# Patient Record
Sex: FEMALE | Race: WHITE | NOT HISPANIC OR LATINO | Employment: FULL TIME | URBAN - NONMETROPOLITAN AREA
[De-identification: names, ages, dates, MRNs, and addresses within clinical notes are randomized per-mention and may not be internally consistent; named-entity substitution may affect disease eponyms.]

---

## 2017-01-27 ENCOUNTER — OFFICE VISIT (OUTPATIENT)
Dept: URGENT CARE | Facility: PHYSICIAN GROUP | Age: 47
End: 2017-01-27
Payer: COMMERCIAL

## 2017-01-27 VITALS
TEMPERATURE: 98.8 F | OXYGEN SATURATION: 98 % | WEIGHT: 178 LBS | HEART RATE: 82 BPM | DIASTOLIC BLOOD PRESSURE: 86 MMHG | BODY MASS INDEX: 30.54 KG/M2 | SYSTOLIC BLOOD PRESSURE: 144 MMHG

## 2017-01-27 DIAGNOSIS — M25.531 PAIN IN BOTH WRISTS: ICD-10-CM

## 2017-01-27 DIAGNOSIS — M25.532 PAIN IN BOTH WRISTS: ICD-10-CM

## 2017-01-27 DIAGNOSIS — R20.0 BILATERAL HAND NUMBNESS: ICD-10-CM

## 2017-01-27 PROCEDURE — 99203 OFFICE O/P NEW LOW 30 MIN: CPT | Performed by: PHYSICIAN ASSISTANT

## 2017-01-27 NOTE — MR AVS SNAPSHOT
Davina Jones   2017 11:45 AM   Office Visit   MRN: [id number]    Department:  [facility]   Dept Phone:  [phone]    Description:  Female : 1970   Provider:  Annmarie Hogan PA-C           Reason for Visit     Numbness hands and arms, x1 month      Allergies as of 2017     No Known Allergies      You were diagnosed with     Pain in both wrists   [076022]       Bilateral hand numbness   [[id number]]         Vital Signs     Blood Pressure Pulse Temperature Weight Oxygen Saturation Last Menstrual Period    144/86 mmHg 82 37.1 °C (98.8 °F) 80.74 kg (178 lb) 98% 2011    Smoking Status                   Current Some Day Smoker           Basic Information     Date Of Birth Sex Race Ethnicity Preferred Language    1970 Female White Non- English      Problem List              ICD-10-CM Priority Class Noted - Resolved    Incomplete bladder emptying R33.9   2012 - Present    Hyperlipidemia E78.5   2015 - Present    Vitamin D deficiency E55.9   2015 - Present    Obesity (BMI 30-39.9) E66.9   2015 - Present      Health Maintenance        Date Due Completion Dates    IMM DTaP/Tdap/Td Vaccine (1 - Tdap) 1990 ---    PAP SMEAR 1992 ---    MAMMOGRAM 10/11/2013 10/11/2012, 12/10/2010    IMM INFLUENZA (1) 2016            Current Immunizations     Influenza TIV (IM) 2011  5:11 PM    Pneumococcal polysaccharide vaccine (PPSV-23) 2011  5:11 PM      Below and/or attached are the medications your provider expects you to take. Review all of your home medications and newly ordered medications with your provider and/or pharmacist. Follow medication instructions as directed by your provider and/or pharmacist. Please keep your medication list with you and share with your provider. Update the information when medications are discontinued, doses are changed, or new medications (including over-the-counter products) are added; and  carry medication information at all times in the event of emergency situations     Allergies:  No Known Allergies          Medications  Valid as of: January 27, 2017 - 12:08 PM    Generic Name Brand Name Tablet Size Instructions for use    Cyclobenzaprine HCl (Tab) FLEXERIL 10 MG Take 1 Tab by mouth 3 times a day as needed for Muscle Spasms.        Diclofenac Sodium (Gel) Diclofenac Sodium 1 % Apply 2 g to skin as directed 4 times a day as needed (wrist pain).        Hydrocodone-Acetaminophen (Tab) VICODIN 5-500 MG Take 1 Tab by mouth every 8 hours as needed (moderate pain). Will cause sedation, avoid driving, operating heavy machinery, and drinking alcohol          Hydrocodone-Acetaminophen (Tab) VICODIN 5-500 MG Take 1 Tab by mouth every 8 hours as needed.        Ibuprofen   by Does not apply route.          .                 Medicines prescribed today were sent to:     [facility] [id number] [place] [address], [place] - [address] [facility] [address] [place] [address] & [place]    [address] [address] [place] [address]    Phone: [phone] Fax: [phone]    Open 24 Hours?: No      Medication refill instructions:       If your prescription bottle indicates you have medication refills left, it is not necessary to call your provider’s office. Please contact your pharmacy and they will refill your medication.    If your prescription bottle indicates you do not have any refills left, you may request refills at any time through one of the following ways: The online [employer] system (except Urgent Care), by calling your provider’s office, or by asking your pharmacy to contact your provider’s office with a refill request. Medication refills are processed only during regular business hours and may not be available until the next business day. Your provider may request additional information or to have a follow-up visit with you prior to refilling your medication.   *Please Note: Medication refills are assigned a new Rx number  when refilled electronically. Your pharmacy may indicate that no refills were authorized even though a new prescription for the same medication is available at the pharmacy. Please request the medicine by name with the pharmacy before contacting your provider for a refill.           MyChart Access Code: Activation code not generated  Current [employer] Status: Active

## 2017-01-27 NOTE — PROGRESS NOTES
Chief Complaint   Patient presents with   • Numbness     hands and arms, x1 month       HISTORY OF PRESENT ILLNESS: Patient is a 46 y.o. female who presents today for evaluation of a one-month history of bilateral hand and arm pain. Patient states the pain is in both forearms and extends as far up as the elbows. The pain is worse at night and her wrists and hands. She complains of numbness in her hands and decreased  strength bilaterally. She has been taking 800 mg of ibuprofen 3 times a day for the past week and a half. 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 She has no history of carpal tunnel syndrome. She states the numbness is the worst in digits one through 3 but states that she has it in her entire hand.    Patient Active Problem List    Diagnosis Date Noted   • Hyperlipidemia 11/06/2015   • Vitamin D deficiency 11/06/2015   • Obesity (BMI 30-39.9) 11/06/2015   • Incomplete bladder emptying 11/28/2012       Allergies:Review of patient's allergies indicates no known allergies.    Current Outpatient Prescriptions Ordered in [facility]   Medication Sig Dispense Refill   • Diclofenac Sodium (VOLTAREN) 1 % Gel Apply 2 g to skin as directed 4 times a day as needed (wrist pain). 1 Tube 1   • IBUPROFEN by Does not apply route.       • hydrocodone-acetaminophen (VICODIN) 5-500 MG TABS Take 1 Tab by mouth every 8 hours as needed. 25 Each 0   • cyclobenzaprine (FLEXERIL) 10 MG TABS Take 1 Tab by mouth 3 times a day as needed for Muscle Spasms. 30 Tab 0   • hydrocodone-acetaminophen (VICODIN) 5-500 MG TABS Take 1 Tab by mouth every 8 hours as needed (moderate pain). Will cause sedation, avoid driving, operating heavy machinery, and drinking alcohol   15 Each 0     No current [facility]-ordered facility-administered medications on file.       Past Medical History   Diagnosis Date   • Hepatitis A 1997   • Dental disorder 01-04-11     cavities and broken teeth   • Pain 01-04-11     pelvic area, 5/10        Social History   Substance Use Topics   • Smoking status: Current Some Day Smoker -- 0.50 packs/day for 27 years     Types: Cigarettes     Last Attempt to Quit: 2012   • Smokeless tobacco: Never Used      Comment: 1/2-pack a day/ 27 years   • Alcohol Use: Yes      Comment: occassional       Family Status   Relation Status Death Age   • Mother Alive    • Father Alive    • Sister  49   • Maternal Grandmother     • Maternal Grandfather     • Paternal Grandmother     • Paternal Grandfather       Family History   Problem Relation Age of Onset   • Heart Disease Mother    • Hyperlipidemia Mother    • Hypertension Mother    • Lung Disease Father      copd   • Heart Disease Father    • Cancer Sister      lung, smoker   • Diabetes Maternal Grandmother    • Cancer Paternal Grandfather      leukemia       ROS:   Review of Systems   Constitutional: Negative for fever, chills, weight loss and malaise/fatigue.   HENT: Negative for ear pain, nosebleeds, congestion, sore throat and neck pain.    Eyes: Negative for blurred vision.   Respiratory: Negative for cough, sputum production, shortness of breath and wheezing.    Cardiovascular: Negative for chest pain, palpitations, orthopnea and leg swelling.   Gastrointestinal: Negative for heartburn, nausea, vomiting and abdominal pain.   Genitourinary: Negative for dysuria, urgency and frequency.       Exam:  Blood pressure 144/86, pulse 82, temperature 37.1 °C (98.8 °F), weight 80.74 kg (178 lb), last menstrual period 2011, SpO2 98 %.  General: Normal appearing. No distress.  HEENT:  Head is grossly normal.  Pulmonary:  No respiratory distress noted.  Cardiovascular:  Radial pulses are strong and equal bilaterally. Cap refills less than 2 seconds on both hands.  Neurologic: Grossly nonfocal. Generalized very mild sensory deficit noted on both hands.  Extremities: Full range of motion bilateral hands, wrists, and elbows.   strength is strong and equal bilaterally. No extremity swelling noted. Negative Tinel's.  Skin: No obvious lesions.  Psych: Normal mood. Alert and oriented x3. Judgment and insight is normal.    Assessment/Plan:  Continue anti-inflammatories daily. Discussed appropriate dosing of this medication. Advise purchasing wrist guards to use at night only. Follow-up worsening or persistent symptoms. May need to consider orthopedic referral.  1. Pain in both wrists  Diclofenac Sodium (VOLTAREN) 1 % Gel   2. Bilateral hand numbness

## 2017-09-27 ENCOUNTER — OFFICE VISIT (OUTPATIENT)
Dept: URGENT CARE | Facility: PHYSICIAN GROUP | Age: 47
End: 2017-09-27
Payer: COMMERCIAL

## 2017-09-27 VITALS
BODY MASS INDEX: 29.26 KG/M2 | WEIGHT: 171.4 LBS | RESPIRATION RATE: 12 BRPM | DIASTOLIC BLOOD PRESSURE: 84 MMHG | OXYGEN SATURATION: 98 % | TEMPERATURE: 98.9 F | SYSTOLIC BLOOD PRESSURE: 122 MMHG | HEART RATE: 74 BPM | HEIGHT: 64 IN

## 2017-09-27 DIAGNOSIS — A08.4 VIRAL GASTROENTERITIS: ICD-10-CM

## 2017-09-27 PROCEDURE — 99214 OFFICE O/P EST MOD 30 MIN: CPT | Performed by: PHYSICIAN ASSISTANT

## 2017-09-27 RX ORDER — ONDANSETRON 4 MG/1
TABLET, FILM COATED ORAL
Qty: 20 TAB | Refills: 0 | Status: SHIPPED | OUTPATIENT
Start: 2017-09-27 | End: 2017-10-27

## 2017-09-27 RX ORDER — ONDANSETRON 2 MG/ML
4 INJECTION INTRAMUSCULAR; INTRAVENOUS ONCE
Status: COMPLETED | OUTPATIENT
Start: 2017-09-27 | End: 2017-09-27

## 2017-09-27 RX ADMIN — ONDANSETRON 4 MG: 2 INJECTION INTRAMUSCULAR; INTRAVENOUS at 16:14

## 2017-09-27 ASSESSMENT — ENCOUNTER SYMPTOMS
SORE THROAT: 0
CHILLS: 1
ABDOMINAL PAIN: 0
FEVER: 0
NAUSEA: 1
COUGH: 0
MYALGIAS: 1
VOMITING: 1
HEARTBURN: 0
HEADACHES: 0
CONSTIPATION: 0
DIARRHEA: 1

## 2017-09-27 NOTE — LETTER
September 27, 2017         Patient: Davina Jones   YOB: 1970   Date of Visit: 9/27/2017           To Whom it May Concern:    Davina Jones was seen in my clinic on 9/27/2017. She can return to work on Sunday, October 1st.    If you have any questions or concerns, please don't hesitate to call.        Sincerely,           Magnolia Callejas P.A.-C.  Electronically Signed

## 2017-09-27 NOTE — PROGRESS NOTES
"Subjective:      Davina Jones is a 47 y.o. female who presents with Emesis (dehydration, diarrhea, nausea, dizzy)            Subjective: Patient is a 47-year-old female with prior hysterectomy, who comes in with a one day history of vomiting ×3 and diarrhea approximately 8-10 brown loose bowel movements. She denies any recent travel or recent antibiotic use. This started last night. No one else has similar symptoms. She states that she feels weak because of the episodes of vomiting and diarrhea. She's had some liquids but has not had any solids. She has no appetite. She denies abdominal pain, rash, any respiratory symptoms    Past medical history: Is not pertinent to this examination  Past surgical history: Not pertinent to this examination  Family history: Is not pertinent to this examination  Allergies: No known drug allergies  Social history: Is reviewed in Epic          Review of Systems   Constitutional: Positive for chills. Negative for fever.   HENT: Negative for sore throat.    Respiratory: Negative for cough.    Cardiovascular: Negative for chest pain.   Gastrointestinal: Positive for diarrhea, nausea and vomiting. Negative for abdominal pain, constipation and heartburn.   Genitourinary: Negative for dysuria and urgency.   Musculoskeletal: Positive for myalgias.   Skin: Negative for rash.   Neurological: Negative for headaches.          Objective:     /84   Pulse 74   Temp 37.2 °C (98.9 °F)   Resp 12   Ht 1.626 m (5' 4\")   Wt 77.7 kg (171 lb 6.4 oz)   LMP 01/01/2011   SpO2 98%   BMI 29.42 kg/m²      Physical Exam       Gen.: Patient is A and O ×3, no acute distress, well-nourished well-hydrated  Vitals: Are listed and unremarkable  HEENT: Heads normocephalic, atraumatic, PERRLA, extraocular movements intact, TMs and oropharynx clear  Neck: Soft supple without cervical lymphadenopathy  Cardiovascular: Regular rate and rhythm normal S1 and S2. No murmurs, rubs or gallops  Lungs " are clear to auscultation bilaterally. no wheezes rales or rhonchi  Abdomen is soft, nontender, nondistended with good bowel sounds, no hepatosplenomegaly  Skin: Is well perfused without evidence of rash or lesions  Neurological:  cranial nerves II through XII were assessed and intact.  Musculoskeletal: Full range of motion, 5 out of 5 strength against resistance  Neurovascularly: Intact with a 2 second cap refill, good distal pulses      Urgent care course: Urinalysis would have been desired. However patient urinated right before I came in and stated she did not want to wait and drink water to go again. Zofran 4 mg was given IM   Assessment/Plan:     1. Viral gastroenteritis  Likely viral etiology given that she has no urinary symptoms. Discussed with patient to rest, increase fluids and follow-up if symptoms persist or worsen despite treatment in the next 24 hours  - ondansetron (ZOFRAN) 4 MG Tab tablet; Take one pill every eight hours as needed for nausea  Dispense: 20 Tab; Refill: 0  - ondansetron (ZOFRAN) syringe/vial injection 4 mg; 2 mL by Intramuscular route Once.

## 2017-12-28 ENCOUNTER — HOSPITAL ENCOUNTER (OUTPATIENT)
Facility: MEDICAL CENTER | Age: 47
End: 2017-12-28
Attending: FAMILY MEDICINE
Payer: COMMERCIAL

## 2017-12-28 ENCOUNTER — OFFICE VISIT (OUTPATIENT)
Dept: MEDICAL GROUP | Facility: PHYSICIAN GROUP | Age: 47
End: 2017-12-28
Payer: COMMERCIAL

## 2017-12-28 VITALS
HEART RATE: 78 BPM | RESPIRATION RATE: 16 BRPM | HEIGHT: 65 IN | SYSTOLIC BLOOD PRESSURE: 122 MMHG | DIASTOLIC BLOOD PRESSURE: 76 MMHG | WEIGHT: 182 LBS | TEMPERATURE: 97.2 F | BODY MASS INDEX: 30.32 KG/M2 | OXYGEN SATURATION: 98 %

## 2017-12-28 DIAGNOSIS — N81.10 BLADDER PROLAPSE, FEMALE, ACQUIRED: ICD-10-CM

## 2017-12-28 DIAGNOSIS — Z23 NEED FOR VACCINATION: ICD-10-CM

## 2017-12-28 DIAGNOSIS — K62.3 RECTAL PROLAPSE: ICD-10-CM

## 2017-12-28 DIAGNOSIS — E78.5 DYSLIPIDEMIA: ICD-10-CM

## 2017-12-28 DIAGNOSIS — Z12.39 BREAST CANCER SCREENING: ICD-10-CM

## 2017-12-28 DIAGNOSIS — E55.9 VITAMIN D DEFICIENCY: ICD-10-CM

## 2017-12-28 DIAGNOSIS — Z11.3 SCREENING EXAMINATION FOR STD (SEXUALLY TRANSMITTED DISEASE): ICD-10-CM

## 2017-12-28 PROCEDURE — 87491 CHLMYD TRACH DNA AMP PROBE: CPT

## 2017-12-28 PROCEDURE — 90471 IMMUNIZATION ADMIN: CPT | Performed by: FAMILY MEDICINE

## 2017-12-28 PROCEDURE — 90715 TDAP VACCINE 7 YRS/> IM: CPT | Performed by: FAMILY MEDICINE

## 2017-12-28 PROCEDURE — 87591 N.GONORRHOEAE DNA AMP PROB: CPT

## 2017-12-28 PROCEDURE — 99214 OFFICE O/P EST MOD 30 MIN: CPT | Mod: 25 | Performed by: FAMILY MEDICINE

## 2017-12-28 ASSESSMENT — PATIENT HEALTH QUESTIONNAIRE - PHQ9: CLINICAL INTERPRETATION OF PHQ2 SCORE: 0

## 2017-12-28 NOTE — ASSESSMENT & PLAN NOTE
She had new sexual partner about 3 months ago, they did not use condoms.   She would like STD screening.   Vaginal swab done for GC/CT and blood test for HIV and syphillis  Pelvic exam done: no abnormal discharge.

## 2017-12-28 NOTE — ASSESSMENT & PLAN NOTE
Since her hysterectomy (done for dysmenorrhea) in 2011, she has noticed gradual problems with her bladder and rectum. She notes in the last few months a worsening prolapse of her rectum into her vagina. With bowel movements she has to put her finger in her vagina and press behind on her rectum through her vaginal wall to get a bowel movement. Noticing she has to do this with every bowel movement now but in the past it was less frequent. She also has bladder prolapse and urinary urgency.

## 2017-12-28 NOTE — ASSESSMENT & PLAN NOTE
After hysterectomy in 2011 she has bladder prolapse and urinary urgency. Also had urinary incontinence during intercourse once.   She was followed by urology in the past will refer back.

## 2017-12-29 ENCOUNTER — HOSPITAL ENCOUNTER (OUTPATIENT)
Dept: LAB | Facility: MEDICAL CENTER | Age: 47
End: 2017-12-29
Attending: FAMILY MEDICINE
Payer: COMMERCIAL

## 2017-12-29 DIAGNOSIS — N81.10 BLADDER PROLAPSE, FEMALE, ACQUIRED: ICD-10-CM

## 2017-12-29 DIAGNOSIS — E78.5 DYSLIPIDEMIA: ICD-10-CM

## 2017-12-29 DIAGNOSIS — K62.3 RECTAL PROLAPSE: ICD-10-CM

## 2017-12-29 DIAGNOSIS — Z11.3 SCREENING EXAMINATION FOR STD (SEXUALLY TRANSMITTED DISEASE): ICD-10-CM

## 2017-12-29 DIAGNOSIS — E55.9 VITAMIN D DEFICIENCY: ICD-10-CM

## 2017-12-29 LAB
ALBUMIN SERPL BCP-MCNC: 4 G/DL (ref 3.2–4.9)
ALBUMIN/GLOB SERPL: 1.4 G/DL
ALP SERPL-CCNC: 60 U/L (ref 30–99)
ALT SERPL-CCNC: 14 U/L (ref 2–50)
ANION GAP SERPL CALC-SCNC: 6 MMOL/L (ref 0–11.9)
AST SERPL-CCNC: 14 U/L (ref 12–45)
BILIRUB SERPL-MCNC: 0.4 MG/DL (ref 0.1–1.5)
BUN SERPL-MCNC: 12 MG/DL (ref 8–22)
CALCIUM SERPL-MCNC: 8.9 MG/DL (ref 8.5–10.5)
CHLORIDE SERPL-SCNC: 106 MMOL/L (ref 96–112)
CHOLEST SERPL-MCNC: 176 MG/DL (ref 100–199)
CO2 SERPL-SCNC: 25 MMOL/L (ref 20–33)
CREAT SERPL-MCNC: 0.56 MG/DL (ref 0.5–1.4)
GFR SERPL CREATININE-BSD FRML MDRD: >60 ML/MIN/1.73 M 2
GLOBULIN SER CALC-MCNC: 2.8 G/DL (ref 1.9–3.5)
GLUCOSE SERPL-MCNC: 89 MG/DL (ref 65–99)
HDLC SERPL-MCNC: 45 MG/DL
LDLC SERPL CALC-MCNC: 115 MG/DL
POTASSIUM SERPL-SCNC: 3.9 MMOL/L (ref 3.6–5.5)
PROT SERPL-MCNC: 6.8 G/DL (ref 6–8.2)
SODIUM SERPL-SCNC: 137 MMOL/L (ref 135–145)
TRIGL SERPL-MCNC: 82 MG/DL (ref 0–149)

## 2017-12-29 PROCEDURE — 36415 COLL VENOUS BLD VENIPUNCTURE: CPT

## 2017-12-29 PROCEDURE — 82306 VITAMIN D 25 HYDROXY: CPT

## 2017-12-29 PROCEDURE — 86780 TREPONEMA PALLIDUM: CPT

## 2017-12-29 PROCEDURE — 87389 HIV-1 AG W/HIV-1&-2 AB AG IA: CPT

## 2017-12-29 PROCEDURE — 80061 LIPID PANEL: CPT

## 2017-12-29 PROCEDURE — 80053 COMPREHEN METABOLIC PANEL: CPT

## 2017-12-30 LAB
25(OH)D3 SERPL-MCNC: 11 NG/ML (ref 30–100)
C TRACH DNA SPEC QL NAA+PROBE: NEGATIVE
HIV 1+2 AB+HIV1 P24 AG SERPL QL IA: NON REACTIVE
N GONORRHOEA DNA SPEC QL NAA+PROBE: NEGATIVE
SPECIMEN SOURCE: NORMAL
TREPONEMA PALLIDUM IGG+IGM AB [PRESENCE] IN SERUM OR PLASMA BY IMMUNOASSAY: NON REACTIVE

## 2018-01-04 NOTE — PROGRESS NOTES
"Subjective:   Davina Jones is a 47 y.o. female here today for evaluation and management of:     Rectal prolapse  Since her hysterectomy (done for dysmenorrhea) in 2011, she has noticed gradual problems with her bladder and rectum. She notes in the last few months a worsening prolapse of her rectum into her vagina. With bowel movements she has to put her finger in her vagina and press behind on her rectum through her vaginal wall to get a bowel movement. Noticing she has to do this with every bowel movement now but in the past it was less frequent. She also has bladder prolapse and urinary urgency.    Screening examination for STD (sexually transmitted disease)  She had new sexual partner about 3 months ago, they did not use condoms.   She would like STD screening.   Vaginal swab done for GC/CT and blood test for HIV and syphillis  Pelvic exam done: no abnormal discharge.     Bladder prolapse, female, acquired  After hysterectomy in 2011 she has bladder prolapse and urinary urgency. Also had urinary incontinence during intercourse once.   She was followed by urology in the past will refer back.            Current medicines (including changes today)  No current outpatient prescriptions on file.     No current facility-administered medications for this visit.      She  has a past medical history of Dental disorder (01-04-11); Hepatitis A (1997); and Pain (01-04-11). She also has no past medical history of Arrhythmia; Asthma; Breast cancer (CMS-HCC); Clotting disorder (CMS-HCC); Diabetes (CMS-HCC); Heart attack; Heart murmur; Hypertension; Seizure (CMS-HCC); or Stroke (CMS-HCC).    ROS  No chest pain, no shortness of breath, no abdominal pain       Objective:     Blood pressure 122/76, pulse 78, temperature 36.2 °C (97.2 °F), resp. rate 16, height 1.651 m (5' 5\"), weight 82.6 kg (182 lb), last menstrual period 01/01/2011, SpO2 98 %. Body mass index is 30.29 kg/m².   Physical Exam:  Constitutional: Alert, " no distress.  Skin: Warm, dry, good turgor, no rashes in visible areas.  Eye: Equal, round and reactive, conjunctiva clear, lids normal.  ENMT: Lips without lesions, good dentition, oropharynx clear.  Neck: Trachea midline, no masses, no thyromegaly. No cervical or supraclavicular lymphadenopathy  Respiratory: Unlabored respiratory effort, lungs clear to auscultation, no wheezes, no ronchi.  Cardiovascular: Normal S1, S2, no murmur, no edema.  Abdomen: Soft, non-tender, no masses, no hepatosplenomegaly.  Psych: Alert and oriented x3, normal affect and mood.  Pelvic exam: external no lesions. Bladder and rectal prolapse into vagina to introitus. Cervix surgically absent, no adnexal masses, no abnormal discharge.   Rectal exam: normal sphincter tone, no gross blood, no rectal masses. Rectocele into vagina.         Assessment and Plan:   The following treatment plan was discussed    1. Rectal prolapse  - COMP METABOLIC PANEL; Future  - US-GYN-PELVIS TRANSVAGINAL; Future    2. Screening examination for STD (sexually transmitted disease)    - CHLAMYDIA/GC PCR URINE OR SWAB; Future  - HIV ANTIBODIES; Future  - RPR (SYPHILIS); Future    3. Bladder prolapse, female, acquired    - COMP METABOLIC PANEL; Future  - UA/M W/RFLX CULTURE, ROUTINE  - US-GYN-PELVIS TRANSVAGINAL; Future  - REFERRAL TO UROLOGY    4. Breast cancer screening    - MA-MAMMO SCREENING BILAT W/YEISON W/CAD; Future    5. Dyslipidemia    - LIPID PROFILE; Future    6. Vitamin D deficiency    - VITAMIN D,25 HYDROXY; Future    7. Need for vaccination    - Tdap =>8yo IM      Followup: Return in about 3 months (around 3/28/2018) for lab review, bladder prolapse.

## 2018-01-18 ENCOUNTER — HOSPITAL ENCOUNTER (OUTPATIENT)
Dept: RADIOLOGY | Facility: MEDICAL CENTER | Age: 48
End: 2018-01-18
Attending: FAMILY MEDICINE
Payer: COMMERCIAL

## 2018-01-18 DIAGNOSIS — Z12.39 BREAST CANCER SCREENING: ICD-10-CM

## 2018-01-18 DIAGNOSIS — K62.3 RECTAL PROLAPSE: ICD-10-CM

## 2018-01-18 DIAGNOSIS — N81.10 BLADDER PROLAPSE, FEMALE, ACQUIRED: ICD-10-CM

## 2018-01-18 PROCEDURE — 76830 TRANSVAGINAL US NON-OB: CPT

## 2018-01-18 PROCEDURE — 77067 SCR MAMMO BI INCL CAD: CPT

## 2018-01-22 ENCOUNTER — HOSPITAL ENCOUNTER (OUTPATIENT)
Dept: LAB | Facility: MEDICAL CENTER | Age: 48
End: 2018-01-22
Attending: FAMILY MEDICINE
Payer: COMMERCIAL

## 2018-01-22 ENCOUNTER — OFFICE VISIT (OUTPATIENT)
Dept: MEDICAL GROUP | Facility: PHYSICIAN GROUP | Age: 48
End: 2018-01-22
Payer: COMMERCIAL

## 2018-01-22 VITALS
HEART RATE: 101 BPM | OXYGEN SATURATION: 97 % | HEIGHT: 65 IN | TEMPERATURE: 98.6 F | DIASTOLIC BLOOD PRESSURE: 74 MMHG | RESPIRATION RATE: 16 BRPM | SYSTOLIC BLOOD PRESSURE: 120 MMHG | WEIGHT: 181 LBS | BODY MASS INDEX: 30.16 KG/M2

## 2018-01-22 DIAGNOSIS — N81.10 BLADDER PROLAPSE, FEMALE, ACQUIRED: ICD-10-CM

## 2018-01-22 DIAGNOSIS — N94.10 DYSPAREUNIA, FEMALE: ICD-10-CM

## 2018-01-22 DIAGNOSIS — K62.3 RECTAL PROLAPSE: ICD-10-CM

## 2018-01-22 LAB
APPEARANCE UR: CLEAR
BILIRUB UR QL STRIP.AUTO: NEGATIVE
COLOR UR: YELLOW
CULTURE IF INDICATED INDCX: NO UA CULTURE
GLUCOSE UR STRIP.AUTO-MCNC: NEGATIVE MG/DL
KETONES UR STRIP.AUTO-MCNC: NEGATIVE MG/DL
LEUKOCYTE ESTERASE UR QL STRIP.AUTO: NEGATIVE
MICRO URNS: NORMAL
NITRITE UR QL STRIP.AUTO: NEGATIVE
PH UR STRIP.AUTO: 7.5 [PH]
PROT UR QL STRIP: NEGATIVE MG/DL
RBC UR QL AUTO: NEGATIVE
SP GR UR STRIP.AUTO: 1.01
UROBILINOGEN UR STRIP.AUTO-MCNC: 0.2 MG/DL

## 2018-01-22 PROCEDURE — 81003 URINALYSIS AUTO W/O SCOPE: CPT

## 2018-01-22 PROCEDURE — 99214 OFFICE O/P EST MOD 30 MIN: CPT | Performed by: FAMILY MEDICINE

## 2018-01-22 NOTE — LETTER
2018        Davina Jones  1970 is my patient. Due to current evaluation for medical condition, she cannot repeatedly  >20 lbs. Please accommodate this in her work. This is not a permanent condition, it is undergoing evaluation and treatment.     Please contact me with any questions.     Thank you,             Lisa Lewis M.D

## 2018-01-22 NOTE — ASSESSMENT & PLAN NOTE
Since her hysterectomy done for dysmenorrhea in 2011, patient notes problems with bladder and rectum she notes worsening prolapse of rectum into vagina, She has constipation unless she puts her finger in her vagina to press on her rectum to get a bowel movement.   Referral to urology and gynecology in place. If needed we will refer to GI and general surgery.   Encouraged her to continue with hydration, adequate fiber and staying active to avoid constipation.

## 2018-01-22 NOTE — PROGRESS NOTES
Subjective:   Davina Jones is a 47 y.o. female here today for evaluation and management of:     Bladder prolapse, female, acquired  Patient notes bladder prolapse and urinary urgency since hysterectomy in 2011, she has urinary incontinence and pain with intercourse.   She has a referral back to urology.  Will place referral to gynecology also.   Will check UA    Recent pelvic US Jan 2018  1.  Status post hysterectomy  2.  Right ovarian cysts and/or paraovarian cysts. Postoperative seromas/lymphoceles and/or even a segment of dilated fallopian tube are possible. No hyperemia in the region is seen to suggest superinfection and no clear nodularity is identified to   indicate malignancy.    She is having difficulty at work especially when squatting and lifting. Letter for her work place with restriction in lifting weight provided.     Rectal prolapse  Since her hysterectomy done for dysmenorrhea in 2011, patient notes problems with bladder and rectum she notes worsening prolapse of rectum into vagina, She has constipation unless she puts her finger in her vagina to press on her rectum to get a bowel movement.   Referral to urology and gynecology in place. If needed we will refer to GI and general surgery.   Encouraged her to continue with hydration, adequate fiber and staying active to avoid constipation.     Dyspareunia, female  She has been noticing increasing pain with intercourse since her hysterectomy in 2011.   She noted urinary incontinence once with intercourse. Due to the symptoms she has stopped sexual intercourse.   Referral to gyn and urology in place.   Advised on water based lubricants, but her problem is not vaginal dryness.   Labs negative for chlamydia, gonorrhea, syphilis, HIV.         Current medicines (including changes today)  No current outpatient prescriptions on file.     No current facility-administered medications for this visit.      She  has a past medical history of Dental  "disorder (01-04-11); Hepatitis A (1997); and Pain (01-04-11). She also has no past medical history of Arrhythmia; Asthma; Breast cancer (CMS-[facility], [facility]); Clotting disorder (CMS-[facility], located [facility]); Diabetes (CMS-HCC); Heart attack; Heart murmur; Hypertension; Seizure (CMS-[facility], located [facility]); or Stroke (CMS-[facility], located [facility]).    ROS  No chest pain, no shortness of breath, no abdominal pain       Objective:     Blood pressure 120/74, pulse (!) 101, temperature 37 °C (98.6 °F), resp. rate 16, height 1.651 m (5' 5\"), weight 82.1 kg (181 lb), last menstrual period 01/01/2011, SpO2 97 %. Body mass index is 30.12 kg/m².   Physical Exam:  Constitutional: Alert, no distress.  Skin: Warm, dry, good turgor, no rashes in visible areas.  Eye: Equal, round and reactive, conjunctiva clear, lids normal.  ENMT: Lips without lesions, good dentition, oropharynx clear.  Neck: Trachea midline, no masses, no thyromegaly. No cervical or supraclavicular lymphadenopathy  Respiratory: Unlabored respiratory effort, lungs clear to auscultation, no wheezes, no ronchi.  Cardiovascular: Normal S1, S2, no murmur, no edema.  Abdomen: Soft, non-tender, no masses, no hepatosplenomegaly.  Psych: Alert and oriented x3, normal affect and mood.        Assessment and Plan:   The following treatment plan was discussed    1. Bladder prolapse, female, acquired  - URINALYSIS,CULTURE IF INDICATED; Future  - REFERRAL TO GYNECOLOGY    2. Rectal prolapse  - REFERRAL TO GYNECOLOGY    3. Dyspareunia, female  - URINALYSIS,CULTURE IF INDICATED; Future  - REFERRAL TO GYNECOLOGY      Followup: Return in about 2 months (around 3/22/2018).         "

## 2018-01-22 NOTE — ASSESSMENT & PLAN NOTE
She has been noticing increasing pain with intercourse since her hysterectomy in 2011.   She noted urinary incontinence once with intercourse. Due to the symptoms she has stopped sexual intercourse.   Referral to gyn and urology in place.   Advised on water based lubricants, but her problem is not vaginal dryness.   Labs negative for chlamydia, gonorrhea, syphilis, HIV.

## 2018-01-22 NOTE — ASSESSMENT & PLAN NOTE
Patient notes bladder prolapse and urinary urgency since hysterectomy in 2011, she has urinary incontinence and pain with intercourse.   She has a referral back to urology.  Will place referral to gynecology also.   Will check UA    Recent pelvic US Jan 2018  1.  Status post hysterectomy  2.  Right ovarian cysts and/or paraovarian cysts. Postoperative seromas/lymphoceles and/or even a segment of dilated fallopian tube are possible. No hyperemia in the region is seen to suggest superinfection and no clear nodularity is identified to   indicate malignancy.    She is having difficulty at work especially when squatting and lifting. Letter for her work place with restriction in lifting weight provided.

## 2018-02-06 ENCOUNTER — TELEPHONE (OUTPATIENT)
Dept: MEDICAL GROUP | Facility: PHYSICIAN GROUP | Age: 48
End: 2018-02-06

## 2018-02-07 ENCOUNTER — TELEPHONE (OUTPATIENT)
Dept: MEDICAL GROUP | Facility: PHYSICIAN GROUP | Age: 48
End: 2018-02-07

## 2018-02-07 NOTE — TELEPHONE ENCOUNTER
Davina dropped off FMLA paperwork on Thursday 2/1/2018.  Asking for this to be completed by February 7.  Returned call to Davina, no answer, left VM with regards Dr Lewis our of office until Monday Feb 12.  FMLA paperwork needs to be completed by patient's PCP.  Also, I informed Davina  that one of the other providers could send a letter stating Dr Lewis our of office if this would help.  Asking Davina to please return call to [place] office.

## 2018-02-07 NOTE — TELEPHONE ENCOUNTER
----- Message from Marybeth Jimenes, Med Ass't sent at 2/6/2018 10:18 AM PST -----  Regarding: FMLA  Patient called and is needing her FMLA paperwork back by Thursday. Patient does know that Dr. Lewis is out of the office this week.

## 2018-02-08 NOTE — TELEPHONE ENCOUNTER
Davina returned call asking for letter of extension on [facility] paperwork until Dr Lewis returns from [place].  Letter written and Davina notified ready for pick-up, or we can fax. Whichever she prefers.

## 2018-02-13 ENCOUNTER — TELEPHONE (OUTPATIENT)
Dept: MEDICAL GROUP | Facility: PHYSICIAN GROUP | Age: 48
End: 2018-02-13

## 2018-02-13 ENCOUNTER — HOSPITAL ENCOUNTER (EMERGENCY)
Facility: MEDICAL CENTER | Age: 48
End: 2018-02-13
Attending: EMERGENCY MEDICINE
Payer: COMMERCIAL

## 2018-02-13 VITALS
OXYGEN SATURATION: 96 % | BODY MASS INDEX: 31.69 KG/M2 | DIASTOLIC BLOOD PRESSURE: 98 MMHG | TEMPERATURE: 97.7 F | RESPIRATION RATE: 18 BRPM | WEIGHT: 185.63 LBS | HEIGHT: 64 IN | SYSTOLIC BLOOD PRESSURE: 145 MMHG | HEART RATE: 78 BPM

## 2018-02-13 DIAGNOSIS — N81.10 CYSTOCELE WITH RECTOCELE: ICD-10-CM

## 2018-02-13 DIAGNOSIS — N81.6 CYSTOCELE WITH RECTOCELE: ICD-10-CM

## 2018-02-13 PROCEDURE — 99284 EMERGENCY DEPT VISIT MOD MDM: CPT

## 2018-02-13 ASSESSMENT — PAIN SCALES - GENERAL: PAINLEVEL_OUTOF10: 6

## 2018-02-13 NOTE — TELEPHONE ENCOUNTER
----- Message from Fanny Charles, Med Ass't sent at 2/13/2018  8:41 AM PST -----  Regarding: FW: Non-Urgent Medical Question  Contact: [phone]      ----- Message -----  From: Davina Jones  Sent: 2/12/2018  12:28 PM  To: Hayden Cruz  Subject: Non-Urgent Medical Question                      Hello, I was wondering about my paperwork, could you please let me know if it has been completed and sent.   Thank you.  Sincerely Davina Jones

## 2018-02-13 NOTE — TELEPHONE ENCOUNTER
Please help patient with appointment with me soon. All patients with FMLA paperwork need an appointment to fill the paperwork out due to the details needed.   Thank you.   Lisa Lewis M.D.

## 2018-02-14 ENCOUNTER — OFFICE VISIT (OUTPATIENT)
Dept: MEDICAL GROUP | Facility: PHYSICIAN GROUP | Age: 48
End: 2018-02-14
Payer: COMMERCIAL

## 2018-02-14 VITALS
HEART RATE: 101 BPM | RESPIRATION RATE: 16 BRPM | BODY MASS INDEX: 30.49 KG/M2 | HEIGHT: 65 IN | TEMPERATURE: 98.2 F | SYSTOLIC BLOOD PRESSURE: 126 MMHG | WEIGHT: 183 LBS | DIASTOLIC BLOOD PRESSURE: 72 MMHG | OXYGEN SATURATION: 97 %

## 2018-02-14 DIAGNOSIS — K62.3 RECTAL PROLAPSE: ICD-10-CM

## 2018-02-14 DIAGNOSIS — Z71.6 ENCOUNTER FOR SMOKING CESSATION COUNSELING: ICD-10-CM

## 2018-02-14 DIAGNOSIS — N81.10 BLADDER PROLAPSE, FEMALE, ACQUIRED: ICD-10-CM

## 2018-02-14 PROCEDURE — 99214 OFFICE O/P EST MOD 30 MIN: CPT | Performed by: FAMILY MEDICINE

## 2018-02-14 NOTE — LETTER
February 14, 2018         Patient: Davina Jones   YOB: 1970   Date of Visit: 2/14/2018           To Whom it May Concern:    Davina Jones was seen in my clinic on 2/14/2018 at 5 PM. She may return to work 2/15/18.   If you have any questions or concerns, please don't hesitate to call.        Sincerely,           Lisa Lewis M.D.  Electronically Signed

## 2018-02-14 NOTE — ED TRIAGE NOTES
"Pt has a rectal prolapse, states \"coming out of her vagina\". Pt has surgery scheduled in march, pt states \"it's coming out bad\".   "

## 2018-02-14 NOTE — ED NOTES
"Pt states that she has rectocele and cystocele, states that her rectum is prolapsing out \"of my vigina and my bladder is right there with it\", pt states that she has been pushing them back in all day and is feeling a lot of pressure, pt A&O x4, NAD at this time.  "

## 2018-02-14 NOTE — DISCHARGE INSTRUCTIONS
Pelvic Organ Prolapse  Pelvic organ prolapse is the stretching, bulging, or dropping of pelvic organs into an abnormal position. It happens when the muscles and tissues that surround and support pelvic structures are stretched or weak. Pelvic organ prolapse can involve:  · Vagina (vaginal prolapse).  · Uterus (uterine prolapse).  · Bladder (cystocele).  · Rectum (rectocele).  · Intestines (enterocele).  When organs other than the vagina are involved, they often bulge into the vagina or protrude from the vagina, depending on how severe the prolapse is.  CAUSES  Causes of this condition include:  · Pregnancy, labor, and childbirth.  · Long-lasting (chronic) cough.  · Chronic constipation.  · Obesity.  · Past pelvic surgery.  · Aging. During and after menopause, a decreased production of the hormone estrogen can weaken pelvic ligaments and muscles.  · Consistently lifting more than 50 lb (23 kg).  · Buildup of fluid in the abdomen due to certain diseases and other conditions.  SYMPTOMS  Symptoms of this condition include:  · Loss of bladder control when you cough, sneeze, strain, and exercise (stress incontinence). This may be worse immediately following childbirth, and it may gradually improve over time.  · Feeling pressure in your pelvis or vagina. This pressure may increase when you cough or when you are having a bowel movement.  · A bulge that protrudes from the opening of your vagina or against your vaginal wall. If your uterus protrudes through the opening of your vagina and rubs against your clothing, you may also experience soreness, ulcers, infection, pain, and bleeding.  · Increased effort to have a bowel movement or urinate.  · Pain in your low back.  · Pain, discomfort, or disinterest in sexual intercourse.  · Repeated bladder infections (urinary tract infections).  · Difficulty inserting or inability to insert a tampon or applicator.  In some people, this condition does not cause any  symptoms.  DIAGNOSIS  Your health care provider may perform an internal and external vaginal and rectal exam. During the exam, you may be asked to cough and strain while you are lying down, sitting, and standing up. Your health care provider will determine if other tests are required, such as bladder function tests.  TREATMENT  In most cases, this condition needs to be treated only if it produces symptoms. No treatment is guaranteed to correct the prolapse or relieve the symptoms completely. Treatment may include:  · Lifestyle changes, such as:  ¨ Avoiding drinking beverages that contain caffeine.  ¨ Increasing your intake of high-fiber foods. This can help to decrease constipation and straining during bowel movements.  ¨ Emptying your bladder at scheduled times (bladder training therapy). This can help to reduce or avoid urinary incontinence.  ¨ Losing weight if you are overweight or obese.  · Estrogen. Estrogen may help mild prolapse by increasing the strength and tone of pelvic floor muscles.  · Kegel exercises. These may help mild cases of prolapse by strengthening and tightening the muscles of the pelvic floor.  · Pessary insertion. A pessary is a soft, flexible device that is placed into your vagina by your health care provider to help support the vaginal walls and keep pelvic organs in place.  · Surgery. This is often the only form of treatment for severe prolapse. Different types of surgeries are available.  HOME CARE INSTRUCTIONS  · Wear a sanitary pad or absorbent product if you have urinary incontinence.  · Avoid heavy lifting and straining with exercise and work. Do not hold your breath when you perform mild to moderate lifting and exercise activities. Limit your activities as directed by your health care provider.  · Take medicines only as directed by your health care provider.  · Perform Kegel exercises as directed by your health care provider.  · If you have a pessary, take care of it as directed by  your health care provider.  SEEK MEDICAL CARE IF:  · Your symptoms interfere with your daily activities or sex life.  · You need medicine to help with the discomfort.  · You notice bleeding from the vagina that is not related to your period.  · You have a fever.  · You have pain or bleeding when you urinate.  · You have bleeding when you have a bowel movement.  · You lose urine when you have sex.  · You have chronic constipation.  · You have a pessary that falls out.  · You have vaginal discharge that has a bad smell.  · You have low abdominal pain or cramping that is unusual for you.     This information is not intended to replace advice given to you by your health care provider. Make sure you discuss any questions you have with your health care provider.     Document Released: 07/15/2015 Document Reviewed: 07/15/2015  [employer] Interactive Patient Education ©2016 [employer]

## 2018-02-15 NOTE — ASSESSMENT & PLAN NOTE
Patient was seen in ER with discomfort and sensation of prolapse of rectum, exam showed mild prolapse,   She has follow up with urology planned for further scoping and surgery  Work [facility] paperwork filled today

## 2018-02-15 NOTE — ASSESSMENT & PLAN NOTE
PAT paperwork filled today  She has follow up with urology for scoping in March with surgery planned after this.

## 2018-02-15 NOTE — ASSESSMENT & PLAN NOTE
Her sister diet of lung cancer and father was just diagnosed with lung cancer and placed on hospice.   She is motivated to quit smoking and willing to try chantix.

## 2018-02-20 NOTE — PROGRESS NOTES
"Subjective:   Davina Jones is a 47 y.o. female here today for evaluation and management of:     Bladder prolapse, female, acquired  FMLA paperwork filled today  She has follow up with urology for scoping in March with surgery planned after this.     Rectal prolapse  Patient was seen in ER with discomfort and sensation of prolapse of rectum, exam showed mild prolapse,   She has follow up with urology planned for further scoping and surgery  Work FMLA paperwork filled today     Encounter for smoking cessation counseling  Her sister diet of lung cancer and father was just diagnosed with lung cancer and placed on hospice.   She is motivated to quit smoking and willing to try chantix.            Current medicines (including changes today)  Current Outpatient Prescriptions   Medication Sig Dispense Refill   • varenicline (CHANTIX JH) 0.5 MG X 11 & 1 MG X 42 tablet Take as directed 0.5 mg daily for 3 days, 0.5 mg twice a day for 4 days then 1 mg twice daily for tobacco cessation. 56 Tab 3     No current facility-administered medications for this visit.      She  has a past medical history of Dental disorder (01-04-11); Hepatitis A (1997); and Pain (01-04-11). She also has no past medical history of Arrhythmia; Asthma; Breast cancer (CMS-[facility]); Clotting disorder (CMS-HCC); Diabetes (CMS-HCC); Heart attack; Heart murmur; Hypertension; Seizure (CMS-[facility]); or Stroke (CMS-[facility]).    ROS  No chest pain, no shortness of breath, no abdominal pain       Objective:     Blood pressure 126/72, pulse (!) 101, temperature 36.8 °C (98.2 °F), resp. rate 16, height 1.651 m (5' 5\"), weight 83 kg (183 lb), last menstrual period 01/01/2011, SpO2 97 %. Body mass index is 30.45 kg/m².   Physical Exam:  Constitutional: Alert, no distress.  Skin: Warm, dry, good turgor, no rashes in visible areas.  Eye: Equal, round and reactive, conjunctiva clear, lids normal.  ENMT: Lips without lesions, good dentition, oropharynx clear.  Neck: " Trachea midline, no masses, no thyromegaly. No cervical or supraclavicular lymphadenopathy  Respiratory: Unlabored respiratory effort, lungs clear to auscultation, no wheezes, no ronchi.  Cardiovascular: Normal S1, S2, no murmur, no edema.  Abdomen: Soft, non-tender, no masses, no hepatosplenomegaly.  Psych: Alert and oriented x3, normal affect and mood.        Assessment and Plan:   The following treatment plan was discussed    1. Bladder prolapse, female, acquired  Uncontrolled. Has follow up with urology for surgery. FMLA paperwork done today.     2. Rectal prolapse  Uncontrolled. Has follow up with urology for surgery. FMLA paperwork done today.    3. Encounter for smoking cessation counseling  Patient encouraged to quit, tobacco cessation counseling done. Provided with rx for chantix.   - varenicline (CHANTIX JH) 0.5 MG X 11 & 1 MG X 42 tablet; Take as directed 0.5 mg daily for 3 days, 0.5 mg twice a day for 4 days then 1 mg twice daily for tobacco cessation.  Dispense: 56 Tab; Refill: 3      Followup: No Follow-up on file.

## 2018-04-02 ENCOUNTER — HOSPITAL ENCOUNTER (OUTPATIENT)
Dept: LAB | Facility: MEDICAL CENTER | Age: 48
End: 2018-04-02
Attending: UROLOGY
Payer: COMMERCIAL

## 2018-04-02 LAB
APPEARANCE UR: CLEAR
BILIRUB UR QL STRIP.AUTO: NEGATIVE
COLOR UR: YELLOW
GLUCOSE UR STRIP.AUTO-MCNC: NEGATIVE MG/DL
KETONES UR STRIP.AUTO-MCNC: NEGATIVE MG/DL
LEUKOCYTE ESTERASE UR QL STRIP.AUTO: NEGATIVE
MICRO URNS: NORMAL
NITRITE UR QL STRIP.AUTO: NEGATIVE
PH UR STRIP.AUTO: 7.5 [PH]
PROT UR QL STRIP: NEGATIVE MG/DL
RBC UR QL AUTO: NEGATIVE
SP GR UR STRIP.AUTO: 1.01
UROBILINOGEN UR STRIP.AUTO-MCNC: 0.2 MG/DL

## 2018-04-02 PROCEDURE — 81003 URINALYSIS AUTO W/O SCOPE: CPT

## 2018-04-02 PROCEDURE — 87086 URINE CULTURE/COLONY COUNT: CPT

## 2018-04-04 LAB
BACTERIA UR CULT: NORMAL
SIGNIFICANT IND 70042: NORMAL
SITE SITE: NORMAL
SOURCE SOURCE: NORMAL

## 2018-04-06 ENCOUNTER — APPOINTMENT (OUTPATIENT)
Dept: ADMISSIONS | Facility: MEDICAL CENTER | Age: 48
End: 2018-04-06
Attending: UROLOGY
Payer: COMMERCIAL

## 2018-04-11 ENCOUNTER — OFFICE VISIT (OUTPATIENT)
Dept: MEDICAL GROUP | Facility: PHYSICIAN GROUP | Age: 48
End: 2018-04-11
Payer: COMMERCIAL

## 2018-04-11 VITALS
RESPIRATION RATE: 16 BRPM | TEMPERATURE: 97.6 F | SYSTOLIC BLOOD PRESSURE: 124 MMHG | BODY MASS INDEX: 31.49 KG/M2 | HEIGHT: 65 IN | HEART RATE: 78 BPM | WEIGHT: 189 LBS | OXYGEN SATURATION: 97 % | DIASTOLIC BLOOD PRESSURE: 74 MMHG

## 2018-04-11 DIAGNOSIS — K62.3 RECTAL PROLAPSE: ICD-10-CM

## 2018-04-11 DIAGNOSIS — Z71.6 ENCOUNTER FOR SMOKING CESSATION COUNSELING: ICD-10-CM

## 2018-04-11 PROCEDURE — 99213 OFFICE O/P EST LOW 20 MIN: CPT | Performed by: FAMILY MEDICINE

## 2018-04-11 NOTE — ASSESSMENT & PLAN NOTE
She has surgery planned for Friday for bladder prolapse she has severe pain due to this.   She has been unable to work since February due to this problem.

## 2018-04-11 NOTE — PROGRESS NOTES
"Subjective:   Davina Jones is a 47 y.o. female here today for evaluation and management of:     Rectal prolapse  She has surgery planned for Friday for bladder prolapse she has severe pain due to this.   She has been unable to work since February due to this problem.       Encounter for smoking cessation counseling  She is actively working on tobacco cessation is now on chantix sometimes forgets to take it.   Encouraged her to keep it by her toothbrush or set a phone alarm.   Encouraged to stop smoking completely before surgery for faster healing.          Current medicines (including changes today)  Current Outpatient Prescriptions   Medication Sig Dispense Refill   • Mirabegron ER (MYRBETRIQ) 25 MG TABLET SR 24 HR Take 1 Tab by mouth 2 Times a Day.     • varenicline (CHANTIX JH) 0.5 MG X 11 & 1 MG X 42 tablet Take as directed 0.5 mg daily for 3 days, 0.5 mg twice a day for 4 days then 1 mg twice daily for tobacco cessation. 56 Tab 3     No current facility-administered medications for this visit.      She  has a past medical history of Bowel habit changes (04/06/2018); Dental disorder (01-04-11); Hepatitis A (1997); Pain (01-04-11); and Urinary bladder disorder. She also has no past medical history of Arrhythmia; Asthma; Breast cancer (CMS-HCC); Clotting disorder (CMS-HCC); Diabetes (CMS-HCC); Heart attack; Heart murmur; Hypertension; Seizure (CMS-HCC); or Stroke (CMS-HCC).    ROS  No chest pain, no shortness of breath, no abdominal pain       Objective:     Blood pressure 124/74, pulse 78, temperature 36.4 °C (97.6 °F), resp. rate 16, height 1.651 m (5' 5\"), weight 85.7 kg (189 lb), last menstrual period 01/01/2011, SpO2 97 %. Body mass index is 31.45 kg/m².   Physical Exam:  Constitutional: Alert, no distress.  Skin: Warm, dry, good turgor, no rashes in visible areas.  Eye: Equal, round and reactive, conjunctiva clear, lids normal.  ENMT: Lips without lesions, good dentition, oropharynx " clear.  Neck: Trachea midline, no masses, no thyromegaly. No cervical or supraclavicular lymphadenopathy  Respiratory: Unlabored respiratory effort, lungs clear to auscultation, no wheezes, no ronchi.  Cardiovascular: Normal S1, S2, no murmur, no edema.  Abdomen: Soft, non-tender, no masses, no hepatosplenomegaly.  Psych: Alert and oriented x3, normal affect and mood.        Assessment and Plan:   The following treatment plan was discussed    1. Rectal prolapse  Follow up with surgery on Friday.   Encouraged to stop smoking: advised on protein, zinc, vitamin c in diet to help with fast recovery after surgery.     2. Encounter for smoking cessation counseling  Encouraged to stop smoking  Continue with chantix.       Followup: No Follow-up on file.

## 2018-04-11 NOTE — ASSESSMENT & PLAN NOTE
She is actively working on tobacco cessation is now on chantix sometimes forgets to take it.   Encouraged her to keep it by her toothbrush or set a phone alarm.   Encouraged to stop smoking completely before surgery for faster healing.

## 2018-04-13 ENCOUNTER — HOSPITAL ENCOUNTER (OUTPATIENT)
Facility: MEDICAL CENTER | Age: 48
End: 2018-04-13
Attending: UROLOGY | Admitting: UROLOGY
Payer: COMMERCIAL

## 2018-04-13 VITALS
RESPIRATION RATE: 16 BRPM | BODY MASS INDEX: 32.33 KG/M2 | WEIGHT: 189.38 LBS | SYSTOLIC BLOOD PRESSURE: 113 MMHG | HEART RATE: 105 BPM | OXYGEN SATURATION: 94 % | DIASTOLIC BLOOD PRESSURE: 73 MMHG | HEIGHT: 64 IN | TEMPERATURE: 97.7 F

## 2018-04-13 PROCEDURE — 160039 HCHG SURGERY MINUTES - EA ADDL 1 MIN LEVEL 3: Performed by: UROLOGY

## 2018-04-13 PROCEDURE — 700101 HCHG RX REV CODE 250

## 2018-04-13 PROCEDURE — 700111 HCHG RX REV CODE 636 W/ 250 OVERRIDE (IP)

## 2018-04-13 PROCEDURE — 700111 HCHG RX REV CODE 636 W/ 250 OVERRIDE (IP): Performed by: UROLOGY

## 2018-04-13 PROCEDURE — 160025 RECOVERY II MINUTES (STATS): Performed by: UROLOGY

## 2018-04-13 PROCEDURE — 501330 HCHG SET, CYSTO IRRIG TUBING: Performed by: UROLOGY

## 2018-04-13 PROCEDURE — 110454 HCHG SHELL REV 250: Performed by: UROLOGY

## 2018-04-13 PROCEDURE — 500440 HCHG DRESSING, STERILE ROLL (KERLIX): Performed by: UROLOGY

## 2018-04-13 PROCEDURE — 160002 HCHG RECOVERY MINUTES (STAT): Performed by: UROLOGY

## 2018-04-13 PROCEDURE — 700102 HCHG RX REV CODE 250 W/ 637 OVERRIDE(OP)

## 2018-04-13 PROCEDURE — 160036 HCHG PACU - EA ADDL 30 MINS PHASE I: Performed by: UROLOGY

## 2018-04-13 PROCEDURE — 500892 HCHG PACK, PERI-GYN: Performed by: UROLOGY

## 2018-04-13 PROCEDURE — 160009 HCHG ANES TIME/MIN: Performed by: UROLOGY

## 2018-04-13 PROCEDURE — A4314 CATH W/DRAINAGE 2-WAY LATEX: HCPCS | Performed by: UROLOGY

## 2018-04-13 PROCEDURE — 160047 HCHG PACU  - EA ADDL 30 MINS PHASE II: Performed by: UROLOGY

## 2018-04-13 PROCEDURE — 501838 HCHG SUTURE GENERAL: Performed by: UROLOGY

## 2018-04-13 PROCEDURE — A4338 INDWELLING CATHETER LATEX: HCPCS | Performed by: UROLOGY

## 2018-04-13 PROCEDURE — 160046 HCHG PACU - 1ST 60 MINS PHASE II: Performed by: UROLOGY

## 2018-04-13 PROCEDURE — 160028 HCHG SURGERY MINUTES - 1ST 30 MINS LEVEL 3: Performed by: UROLOGY

## 2018-04-13 PROCEDURE — A9270 NON-COVERED ITEM OR SERVICE: HCPCS

## 2018-04-13 PROCEDURE — 500901 HCHG PACKING, VAG 2 X-RAY: Performed by: UROLOGY

## 2018-04-13 PROCEDURE — 160035 HCHG PACU - 1ST 60 MINS PHASE I: Performed by: UROLOGY

## 2018-04-13 PROCEDURE — 700105 HCHG RX REV CODE 258: Performed by: UROLOGY

## 2018-04-13 PROCEDURE — 501445 HCHG STAPLER, SKIN DISP: Performed by: UROLOGY

## 2018-04-13 PROCEDURE — 160048 HCHG OR STATISTICAL LEVEL 1-5: Performed by: UROLOGY

## 2018-04-13 RX ORDER — LIDOCAINE HYDROCHLORIDE 10 MG/ML
INJECTION, SOLUTION INFILTRATION; PERINEURAL
Status: COMPLETED
Start: 2018-04-13 | End: 2018-04-13

## 2018-04-13 RX ORDER — ONDANSETRON 2 MG/ML
INJECTION INTRAMUSCULAR; INTRAVENOUS
Status: COMPLETED
Start: 2018-04-13 | End: 2018-04-13

## 2018-04-13 RX ORDER — MIDAZOLAM HYDROCHLORIDE 1 MG/ML
INJECTION INTRAMUSCULAR; INTRAVENOUS
Status: COMPLETED
Start: 2018-04-13 | End: 2018-04-13

## 2018-04-13 RX ORDER — OXYCODONE HCL 5 MG/5 ML
SOLUTION, ORAL ORAL
Status: COMPLETED
Start: 2018-04-13 | End: 2018-04-13

## 2018-04-13 RX ORDER — SODIUM CHLORIDE, SODIUM LACTATE, POTASSIUM CHLORIDE, CALCIUM CHLORIDE 600; 310; 30; 20 MG/100ML; MG/100ML; MG/100ML; MG/100ML
INJECTION, SOLUTION INTRAVENOUS CONTINUOUS
Status: DISCONTINUED | OUTPATIENT
Start: 2018-04-13 | End: 2018-04-13 | Stop reason: HOSPADM

## 2018-04-13 RX ORDER — LIDOCAINE HYDROCHLORIDE 10 MG/ML
0.5 INJECTION, SOLUTION INFILTRATION; PERINEURAL
Status: DISCONTINUED | OUTPATIENT
Start: 2018-04-13 | End: 2018-04-13 | Stop reason: HOSPADM

## 2018-04-13 RX ADMIN — SODIUM CHLORIDE, SODIUM LACTATE, POTASSIUM CHLORIDE, CALCIUM CHLORIDE: 600; 310; 30; 20 INJECTION, SOLUTION INTRAVENOUS at 09:15

## 2018-04-13 RX ADMIN — FENTANYL CITRATE 50 MCG: 50 INJECTION, SOLUTION INTRAMUSCULAR; INTRAVENOUS at 16:00

## 2018-04-13 RX ADMIN — MIDAZOLAM 1 MG: 1 INJECTION INTRAMUSCULAR; INTRAVENOUS at 12:29

## 2018-04-13 RX ADMIN — ONDANSETRON HYDROCHLORIDE 4 MG: 2 INJECTION, SOLUTION INTRAMUSCULAR; INTRAVENOUS at 16:00

## 2018-04-13 RX ADMIN — LIDOCAINE HYDROCHLORIDE: 10 INJECTION, SOLUTION INFILTRATION; PERINEURAL at 09:15

## 2018-04-13 RX ADMIN — MIDAZOLAM 1 MG: 1 INJECTION INTRAMUSCULAR; INTRAVENOUS at 12:56

## 2018-04-13 RX ADMIN — OXYCODONE HYDROCHLORIDE 5 MG: 5 SOLUTION ORAL at 13:46

## 2018-04-13 ASSESSMENT — PAIN SCALES - GENERAL
PAINLEVEL_OUTOF10: 0
PAINLEVEL_OUTOF10: 5
PAINLEVEL_OUTOF10: 7
PAINLEVEL_OUTOF10: 0

## 2018-04-13 NOTE — OP REPORT
DATE OF SERVICE:  04/13/2018    INDICATION FOR PROCEDURE:  Patient has a symptomatic rectocele and some mild   cystocele and vault prolapse.  I will do exam under anesthesia to assess if   the anterior compartment and vault need repair.  I will definitely proceed   with a rectocele repair and repair of attenuated perineum as needed.    PREOPERATIVE DIAGNOSES:  Grade I cystocele, mild, 3-4 cm of vault prolapse,   but well supported with a grade I cystocele, symptomatic rectocele, and   attenuated peritoneum.    PROCEDURE PERFORMED:  Rectocele repair and perineoplasty, which was necessary   to support the distal rectocele repair.    SURGEON:  Jesus Santana MD    ANESTHESIOLOGIST:  William Forde MD    SPECIMEN:  None.    ESTIMATED BLOOD LOSS:  150 mL.    FINDINGS:  As above.    COMPLICATIONS:  None.    DESCRIPTION OF PROCEDURE:  Procedure was carried out in the following fashion.    After the patient was properly identified, the labs reviewed and consent   verified, the H and P updated.  The plan was discussed with patient and the   operative team.  The patient expressed verbal understanding of the procedure   and desired to proceed and had no further questions.  Patient was then taken   to the operative theater, placed in supine position where general anesthesia   was introduced.  She was then moved to a dorsal lithotomy position with care   being taken to pad all pressure points and avoid any perturbations of joints   that may cause injury and this was maintained throughout the procedure.  The   patient was prepped and draped in usual sterile fashion.  The surgical   time-out was done.  Antibiotics were given and there were no issues with the   time-out.  The procedure was begun by placing a 16-French Vieyra per urethra   draining the bladder.  We did an exam under anesthesia and found there was   minimal 3 maybe 4 cm of vault prolapses ultimately very well supported with a   mild cystocele, grade I.  At this  point, symptomatically, this is not _____   repair.  The posterior compartment had no enterocele, but had a distal to mid   rectocele, which needed to repair and was the main cause of the patient's   rectal symptoms.  The rectocele was very distal and thus reconstructing the   peritoneum was necessary in order to get a complete and adequate repair to   avoid recurrence.  Thus, we infiltrated the posterior compartment with 30 mL   of 0.25% Marcaine with epinephrine.  We then made a maurizio incision from the   beginning of the rectocele out to lateral area of the hymen and down into the   peritoneum and excised this maurizio-shaped area of a basal mucosa and perineal   skin.  We then dissected the rectocele out up past where the rectocele was   present and plicated the lateral tissue over to repair this area.  We then   brought the transverse perineal muscles together closing the hymenal ring and   overlying the rectocele repair and distal portion and to give this a double   closure to keep recurrence from happening.  It was a very good and very solid   repair.  There was minimal bleeding.  We irrigated copiously, placed some   FloSeal to prevent any venous oozing, and then reapproximated the rectocele   over the rectocele of the vaginal epithelium excising some of it to make it   lay well in the posterior compartment, but not under tension.  We then brought   this closure all the way down through to the peritoneum.  Vaginal packing was   placed.  The catheter was left in place.  The patient tolerated the procedure   well and was taken to PACU in stable condition.       ____________________________________     MD SAJI Baltazar / RICK    DD:  04/13/2018 13:07:22  DT:  04/13/2018 14:02:46    D#:  [id number]  Job#:  [id number]

## 2018-04-13 NOTE — OR SURGEON
Immediate Post OP Note    PreOp Diagnosis: grade one cystocele mild 3-4 cm of vault prolapse and symptomatic rectocele, attenuated perineum    PostOp Diagnosis: ssa    Procedure(s): exam under anesthesai  RECTOCELE REPAIR and perineo-plasty - Wound Class: Clean Contaminated    Surgeon(s):  Jesus Santana M.D.    Anesthesiologist/Type of Anesthesia:  Anesthesiologist: William Forde M.D./General    Surgical Staff:  Circulator: Omari Lopes R.N.  Relief Circulator: Karan Giles R.N.  Relief Scrub: Temi Finch  Scrub Person: Ryley Lay    Specimens removed if any:  none    Estimated Blood Loss: 150    Findings: as above    Complications: none    [id number]            4/13/2018 1:00 PM Jesus Santana M.D.

## 2018-04-13 NOTE — DISCHARGE INSTRUCTIONS
ACTIVITY: Rest and take it easy for the first 24 hours.  A responsible adult is recommended to remain with you during that time.  It is normal to feel sleepy.  We encourage you to not do anything that requires balance, judgment or coordination.    MILD FLU-LIKE SYMPTOMS ARE NORMAL. YOU MAY EXPERIENCE GENERALIZED MUSCLE ACHES, THROAT IRRITATION, HEADACHE AND/OR SOME NAUSEA.    FOR 24 HOURS DO NOT:  Drive, operate machinery or run household appliances.  Drink beer or alcoholic beverages.   Make important decisions or sign legal documents.    SPECIAL INSTRUCTIONS:     Patient to be on bedrest at home.  Out of bed 1-2 hours to walk around.   Call if you have vaginal bleeding but spotting is normal.  Call if you have fevers, chills, sweats, chest pain, or lower extremity edema. Or if you have any new symptoms.   No sex/douching/tampons for 6 weeks.  No heavy lifting for 6 weeks.  Light activity x 4 weeks.  Call any questions.      Anterior and Posterior Colporrhaphy  Anterior or posterior colporrhaphy is surgery to fix a prolapse of organs in the genital tract. Prolapse means the falling down, bulging, dropping, or drooping of an organ. Organs that commonly prolapse include the rectum, bladder, vagina, and uterus. Prolapse can affect a single organ or several organs at the same time. This often worsens when women stop having their monthly periods (menopause) because estrogen loss weakens the muscles and tissues in the genital tract. In addition, prolapse happens when the organs are damaged or weakened. This commonly happens after childbirth and as a result of aging. Surgery is often done for severe prolapses.   The type of colporrhaphy done depends on the type of genital prolapse. Types of genital prolapse include the following:   · Cystocele. This is a prolapse of the upper (anterior) wall of the vagina. The anterior wall bulges into the vagina and brings the bladder with it.    · Rectocele. This is a prolapse of the  lower (posterior) wall of the vagina. The posterior vaginal wall bulges into the vagina and brings the rectum with it.    · Enterocele. This is a prolapse of part of the pelvic organs called the pouch of Paolo. It also involves a portion of the small bowel. It appears as a bulge under the neck of the uterus at the top of the back wall of the vagina.    · Procidentia. This is a complete prolapse of the uterus and the cervix. The prolapse can be seen and felt coming out of the vagina.  LET YOUR HEALTH CARE PROVIDER KNOW ABOUT:   · Any allergies you have.    · All medicines you are taking, including vitamins, herbs, eye drops, creams, and over-the-counter medicines.    · Previous problems you or members of your family have had with the use of anesthetics.    · Any blood disorders you have.    · Previous surgeries you have had.    · Medical conditions you have.    · Smoking history or history of alcohol use.    · Possibility of pregnancy, if this applies.    RISKS AND COMPLICATIONS  Generally, anterior or posterior colporrhaphy is a safe procedure. However, as with any procedure, complications can occur. Possible complications include:   · Infection.    · Damage to other organs during surgery.    · Bleeding after surgery.    · Problems urinating.    · Problems from the anesthetic.    BEFORE THE PROCEDURE  · Ask your health care provider about changing or stopping your regular medicines.    · Do not eat or drink anything for at least 8 hours before the surgery.    · If you smoke, do not smoke for at least 2 weeks before the surgery.    · Make plans to have someone drive you home after your hospital stay. Also, arrange for someone to help you with activities during recovery.  PROCEDURE   You may be given medicine to help you relax before the surgery (sedative). During the surgery you will be given medicine to make you sleep through the procedure (general anesthetic) or medicine to numb you from the waist down (spinal  anesthetic). This medicine will be given through an intravenous (IV) access tube that is put into one of your veins.   The procedure will vary depending on the type of repair:   · Anterior repair. A cut (incision) is made in the midline section of the front part of the vaginal wall. A triangular-shaped piece of vaginal tissue is removed, and the stronger, healthier tissue is sewn together in order to support and suspend the bladder.    · Posterior repair. An incision is made midline on the back wall of the vagina. A triangular portion of vaginal skin is removed to expose the muscle. Excess tissue is removed, and stronger, healthier muscle and ligament tissue is sewn together to support the rectum.    · Anterior and posterior repair. Both procedures are done during the same surgery.  AFTER THE PROCEDURE  You will be taken to a recovery area. Your blood pressure, pulse, breathing, and temperature (vital signs) will be monitored. This is done until you are stable. Then you will be transferred to a hospital room.   After surgery, you will have a small rubber tube in place to drain your bladder (urinary catheter). This will be in place for 2 to 7 days or until your bladder is working properly on its own. The IV access tube will be removed in 1 to 3 days. You may have a gauze packing in your vagina to prevent bleeding. This will be removed 2 or 3 days after the surgery. You will likely need to stay in the hospital for 3 to 5 days.   This information is not intended to replace advice given to you by your health care provider. Make sure you discuss any questions you have with your health care provider.    DIET: To avoid nausea, slowly advance diet as tolerated, avoiding spicy or greasy foods for the first day.  Add more substantial food to your diet according to your physician's instructions. INCREASE FLUIDS AND FIBER TO AVOID CONSTIPATION.    SURGICAL DRESSING/BATHING: Vaginal packing out tomorrow at 1pm.    FOLLOW-UP  APPOINTMENT:  A follow-up appointment should be arranged with your doctor in 1-2 weeks; call to schedule.    You should CALL YOUR PHYSICIAN if you develop:  Fever greater than 101 degrees F.  Pain not relieved by medication, or persistent nausea or vomiting.  Excessive bleeding (blood soaking through dressing) or unexpected drainage from the wound.  Extreme redness or swelling around the incision site, drainage of pus or foul smelling drainage.  Inability to urinate or empty your bladder within 8 hours.  Problems with breathing or chest pain.    You should call 911 if you develop problems with breathing or chest pain.  If you are unable to contact your doctor or surgical center, you should go to the nearest emergency room or urgent care center.  Physician's telephone #: Dr. Santana [phone]    If any questions arise, call your doctor.  If your doctor is not available, please feel free to call the Surgical Center at [phone].  The Center is open Monday through Friday from 7AM to 7PM.  You can also call the [facility] HOTLINE open 24 hours/day, 7 days/week and speak to a nurse at [phone], or toll free at [phone].    A registered nurse may call you a few days after your surgery to see how you are doing after your procedure.    MEDICATIONS: Resume taking daily medication.  Take prescribed pain medication with food.  If no medication is prescribed, you may take non-aspirin pain medication if needed.  PAIN MEDICATION CAN BE VERY CONSTIPATING.  Take a stool softener or laxative such as senokot, pericolace, or milk of magnesia if needed.    Prescription given for Norco (pain medication) dose: 5/325; You may take : 1-2 tablets every 4-6 hours as neede for pain. You also have a prescription for Colace (stool softener) 100 mg two times daily as needed.    Last pain medication given at 1:46pm.    If your physician has prescribed pain medication that includes Acetaminophen (Tylenol), do not take additional  Acetaminophen (Tylenol) while taking the prescribed medication.    Depression / Suicide Risk    As you are discharged from this [facility] facility, it is important to learn how to keep safe from harming yourself.    Recognize the warning signs:  · Abrupt changes in personality, positive or negative- including increase in energy   · Giving away possessions  · Change in eating patterns- significant weight changes-  positive or negative  · Change in sleeping patterns- unable to sleep or sleeping all the time   · Unwillingness or inability to communicate  · Depression  · Unusual sadness, discouragement and loneliness  · Talk of wanting to die  · Neglect of personal appearance   · Rebelliousness- reckless behavior  · Withdrawal from people/activities they love  · Confusion- inability to concentrate     If you or a loved one observes any of these behaviors or has concerns about self-harm, here's what you can do:  · Talk about it- your feelings and reasons for harming yourself  · Remove any means that you might use to hurt yourself (examples: pills, rope, extension cords, firearm)  · Get professional help from the community (Mental Health, Substance Abuse, psychological counseling)  · Do not be alone:Call your Safe Contact- someone whom you trust who will be there for you.  · Call your local CRISIS HOTLINE 837-9407 or 045-985-1582  · Call your local Children's Mobile Crisis Response Team [place] (589) 008-1686 or www.SeeSaw.com  · Call the toll free National Suicide Prevention Hotlines   · National Suicide Prevention Lifeline 131-310-LSDB (9535)  · National Hope Line Network 800-SUICIDE (953-0881)

## 2018-04-13 NOTE — OR NURSING
Patient ambulated to restroom and voided 100 cc sanguineous urine. Post void residual bladder scan with 45 cc in bladder. Pt stable for discharge.

## 2018-04-14 NOTE — OR NURSING
Discharge instructions reviewed with patient. IV removed and VSS. All questions answered. Belongings returned to patient.

## 2018-06-07 ENCOUNTER — OFFICE VISIT (OUTPATIENT)
Dept: MEDICAL GROUP | Facility: PHYSICIAN GROUP | Age: 48
End: 2018-06-07
Payer: COMMERCIAL

## 2018-06-07 VITALS
HEART RATE: 91 BPM | OXYGEN SATURATION: 94 % | RESPIRATION RATE: 16 BRPM | TEMPERATURE: 98.2 F | SYSTOLIC BLOOD PRESSURE: 122 MMHG | DIASTOLIC BLOOD PRESSURE: 76 MMHG | BODY MASS INDEX: 32.32 KG/M2 | HEIGHT: 65 IN | WEIGHT: 194 LBS

## 2018-06-07 DIAGNOSIS — N81.10 BLADDER PROLAPSE, FEMALE, ACQUIRED: ICD-10-CM

## 2018-06-07 DIAGNOSIS — R10.12 LEFT UPPER QUADRANT PAIN: ICD-10-CM

## 2018-06-07 DIAGNOSIS — K62.3 RECTAL PROLAPSE: ICD-10-CM

## 2018-06-07 PROCEDURE — 99214 OFFICE O/P EST MOD 30 MIN: CPT | Performed by: FAMILY MEDICINE

## 2018-06-07 RX ORDER — TRAMADOL HYDROCHLORIDE 50 MG/1
50 TABLET ORAL 2 TIMES DAILY PRN
Qty: 60 TAB | Refills: 0 | Status: SHIPPED | OUTPATIENT
Start: 2018-07-07 | End: 2018-08-06

## 2018-06-07 RX ORDER — CYCLOBENZAPRINE HCL 5 MG
5-10 TABLET ORAL 3 TIMES DAILY PRN
Qty: 30 TAB | Refills: 2 | Status: SHIPPED | OUTPATIENT
Start: 2018-06-07

## 2018-06-07 RX ORDER — TRAMADOL HYDROCHLORIDE 50 MG/1
50 TABLET ORAL 2 TIMES DAILY PRN
Qty: 60 TAB | Refills: 0 | Status: SHIPPED | OUTPATIENT
Start: 2018-06-07 | End: 2018-06-07 | Stop reason: SDUPTHER

## 2018-06-07 NOTE — PROGRESS NOTES
"Subjective:   Davina Jones is a 47 y.o. female here today for evaluation of abdominal pain persisting a month after pelvic surgery for rectal and bladder prolapse.   She is in PT currently,       Rectal prolapse  s/p surgical repair which went well.       Bladder prolapse, female, acquired  s/p surgical repair recently. She is able to stand and walk, has BMs and is passing gas but still feels nauseated, taking a lot of peptobismol also with a lot of pain in her abdomen left to right side, and from between her shoulderblades. Will check abdominal xray.            Current medicines (including changes today)  Current Outpatient Prescriptions   Medication Sig Dispense Refill   • [START ON 7/7/2018] tramadol (ULTRAM) 50 MG Tab Take 1 Tab by mouth 2 times a day as needed for up to 30 days. 60 Tab 0   • Mirabegron ER (MYRBETRIQ) 25 MG TABLET SR 24 HR Take 1 Tab by mouth 2 Times a Day.     • varenicline (CHANTIX JH) 0.5 MG X 11 & 1 MG X 42 tablet Take as directed 0.5 mg daily for 3 days, 0.5 mg twice a day for 4 days then 1 mg twice daily for tobacco cessation. 56 Tab 3     No current facility-administered medications for this visit.      She  has a past medical history of Bowel habit changes (04/06/2018); Dental disorder (01-04-11); Hepatitis A (1997); Pain (01-04-11); and Urinary bladder disorder. She also has no past medical history of Breast cancer (HCC) or Clotting disorder (HCC).    ROS  No chest pain, no shortness of breath, no abdominal pain       Objective:     Blood pressure 122/76, pulse 91, temperature 36.8 °C (98.2 °F), resp. rate 16, height 1.651 m (5' 5\"), weight 88 kg (194 lb), last menstrual period 01/01/2011, SpO2 94 %. Body mass index is 32.28 kg/m².   Physical Exam:  Constitutional: Alert, no distress.  Skin: Warm, dry, good turgor, no rashes in visible areas.  Eye: Equal, round and reactive, conjunctiva clear, lids normal.  ENMT: Lips without lesions, good dentition, oropharynx " clear.  Neck: Trachea midline, no masses, no thyromegaly. No cervical or supraclavicular lymphadenopathy  Respiratory: Unlabored respiratory effort, lungs clear to auscultation, no wheezes, no ronchi.  Cardiovascular: Normal S1, S2, no murmur, no edema.  Abdomen: Soft, non-tender, no masses, no hepatosplenomegaly.  Psych: Alert and oriented x3, normal affect and mood.        Assessment and Plan:   The following treatment plan was discussed    1. Rectal prolapse  Doing well after surgery.   Continue with PT    2. Bladder prolapse, female, acquired  Stable and recovering after surgery for bladder prolapse.     3. Left upper quadrant pain  Uncontrolled pain. Providing two months rx of tramadol so she does not overuse ibuprofen/tylenol  Advised no alcohol/marijuana. Encouraged her to stop smoking.    - US-ABDOMEN COMPLETE SURVEY; Future  - CONSENT FOR OPIATE PRESCRIPTION  - tramadol (ULTRAM) 50 MG Tab; Take 1 Tab by mouth 2 times a day as needed for up to 30 days.  Dispense: 60 Tab; Refill: 0      Followup: No Follow-up on file.

## 2018-06-07 NOTE — ASSESSMENT & PLAN NOTE
s/p surgical repair recently. She is able to stand and walk, has BMs and is passing gas but still feels nauseated, taking a lot of peptobismol also with a lot of pain in her abdomen left to right side, and from between her shoulderblades. Will check abdominal xray.

## 2018-06-08 ENCOUNTER — HOSPITAL ENCOUNTER (OUTPATIENT)
Dept: LAB | Facility: MEDICAL CENTER | Age: 48
End: 2018-06-08
Attending: FAMILY MEDICINE
Payer: COMMERCIAL

## 2018-06-08 DIAGNOSIS — R10.12 LEFT UPPER QUADRANT PAIN: ICD-10-CM

## 2018-06-08 LAB
ALBUMIN SERPL BCP-MCNC: 3.9 G/DL (ref 3.2–4.9)
ALBUMIN/GLOB SERPL: 1.4 G/DL
ALP SERPL-CCNC: 59 U/L (ref 30–99)
ALT SERPL-CCNC: 12 U/L (ref 2–50)
ANION GAP SERPL CALC-SCNC: 6 MMOL/L (ref 0–11.9)
AST SERPL-CCNC: 14 U/L (ref 12–45)
BASOPHILS # BLD AUTO: 0.6 % (ref 0–1.8)
BASOPHILS # BLD: 0.06 K/UL (ref 0–0.12)
BILIRUB SERPL-MCNC: 0.3 MG/DL (ref 0.1–1.5)
BUN SERPL-MCNC: 8 MG/DL (ref 8–22)
CALCIUM SERPL-MCNC: 8.8 MG/DL (ref 8.5–10.5)
CHLORIDE SERPL-SCNC: 106 MMOL/L (ref 96–112)
CO2 SERPL-SCNC: 25 MMOL/L (ref 20–33)
CREAT SERPL-MCNC: 0.73 MG/DL (ref 0.5–1.4)
EOSINOPHIL # BLD AUTO: 0.25 K/UL (ref 0–0.51)
EOSINOPHIL NFR BLD: 2.5 % (ref 0–6.9)
ERYTHROCYTE [DISTWIDTH] IN BLOOD BY AUTOMATED COUNT: 42.9 FL (ref 35.9–50)
GLOBULIN SER CALC-MCNC: 2.7 G/DL (ref 1.9–3.5)
GLUCOSE SERPL-MCNC: 90 MG/DL (ref 65–99)
HCT VFR BLD AUTO: 41.6 % (ref 37–47)
HGB BLD-MCNC: 14.1 G/DL (ref 12–16)
IMM GRANULOCYTES # BLD AUTO: 0.02 K/UL (ref 0–0.11)
IMM GRANULOCYTES NFR BLD AUTO: 0.2 % (ref 0–0.9)
LYMPHOCYTES # BLD AUTO: 3.19 K/UL (ref 1–4.8)
LYMPHOCYTES NFR BLD: 31.7 % (ref 22–41)
MCH RBC QN AUTO: 29.9 PG (ref 27–33)
MCHC RBC AUTO-ENTMCNC: 33.9 G/DL (ref 33.6–35)
MCV RBC AUTO: 88.3 FL (ref 81.4–97.8)
MONOCYTES # BLD AUTO: 0.55 K/UL (ref 0–0.85)
MONOCYTES NFR BLD AUTO: 5.5 % (ref 0–13.4)
NEUTROPHILS # BLD AUTO: 5.99 K/UL (ref 2–7.15)
NEUTROPHILS NFR BLD: 59.5 % (ref 44–72)
NRBC # BLD AUTO: 0 K/UL
NRBC BLD-RTO: 0 /100 WBC
PLATELET # BLD AUTO: 261 K/UL (ref 164–446)
PMV BLD AUTO: 11.9 FL (ref 9–12.9)
POTASSIUM SERPL-SCNC: 3.7 MMOL/L (ref 3.6–5.5)
PROT SERPL-MCNC: 6.6 G/DL (ref 6–8.2)
RBC # BLD AUTO: 4.71 M/UL (ref 4.2–5.4)
SODIUM SERPL-SCNC: 137 MMOL/L (ref 135–145)
WBC # BLD AUTO: 10.1 K/UL (ref 4.8–10.8)

## 2018-06-08 PROCEDURE — 36415 COLL VENOUS BLD VENIPUNCTURE: CPT

## 2018-06-08 PROCEDURE — 85025 COMPLETE CBC W/AUTO DIFF WBC: CPT

## 2018-06-08 PROCEDURE — 80053 COMPREHEN METABOLIC PANEL: CPT

## 2018-06-12 ENCOUNTER — HOSPITAL ENCOUNTER (OUTPATIENT)
Dept: RADIOLOGY | Facility: MEDICAL CENTER | Age: 48
End: 2018-06-12
Attending: FAMILY MEDICINE
Payer: COMMERCIAL

## 2018-06-12 DIAGNOSIS — R10.12 LEFT UPPER QUADRANT PAIN: ICD-10-CM

## 2018-06-12 DIAGNOSIS — K86.2 PANCREATIC CYST: ICD-10-CM

## 2018-06-12 DIAGNOSIS — N28.1 RENAL CYST: ICD-10-CM

## 2018-06-12 PROCEDURE — 76700 US EXAM ABDOM COMPLETE: CPT

## 2018-06-12 NOTE — PROGRESS NOTES
Please advise patient that I reviewed preliminary ultrasound results. The radiologist recommends an MRI to evaluate further a cyst seen on the kidney and the pancreas.   Lisa Lewis M.D.

## 2018-06-28 ENCOUNTER — HOSPITAL ENCOUNTER (OUTPATIENT)
Dept: RADIOLOGY | Facility: MEDICAL CENTER | Age: 48
End: 2018-06-28
Attending: FAMILY MEDICINE
Payer: COMMERCIAL

## 2018-06-28 DIAGNOSIS — K86.2 PANCREATIC CYST: ICD-10-CM

## 2018-06-28 DIAGNOSIS — N28.1 RENAL CYST: ICD-10-CM

## 2018-06-28 PROCEDURE — A9579 GAD-BASE MR CONTRAST NOS,1ML: HCPCS | Performed by: FAMILY MEDICINE

## 2018-06-28 PROCEDURE — 74183 MRI ABD W/O CNTR FLWD CNTR: CPT

## 2018-06-28 PROCEDURE — 700117 HCHG RX CONTRAST REV CODE 255: Performed by: FAMILY MEDICINE

## 2018-06-28 RX ADMIN — GADODIAMIDE 20 ML: 287 INJECTION INTRAVENOUS at 13:30

## 2018-06-28 NOTE — PROGRESS NOTES
Davina,  Your MRI was reassuring. The complex cyst of the left kidney is a category 2 which only means it needs a follow up imaging study done in 6-12 months to make sure it stays stable and does not change.    Lisa Lewis M.D.

## 2018-07-19 ENCOUNTER — OFFICE VISIT (OUTPATIENT)
Dept: MEDICAL GROUP | Facility: PHYSICIAN GROUP | Age: 48
End: 2018-07-19
Payer: COMMERCIAL

## 2018-07-19 VITALS
HEART RATE: 85 BPM | WEIGHT: 187 LBS | TEMPERATURE: 98.2 F | DIASTOLIC BLOOD PRESSURE: 68 MMHG | SYSTOLIC BLOOD PRESSURE: 122 MMHG | HEIGHT: 65 IN | RESPIRATION RATE: 16 BRPM | OXYGEN SATURATION: 98 % | BODY MASS INDEX: 31.16 KG/M2

## 2018-07-19 DIAGNOSIS — E66.9 OBESITY (BMI 30-39.9): ICD-10-CM

## 2018-07-19 DIAGNOSIS — N28.1 RENAL CYST: ICD-10-CM

## 2018-07-19 DIAGNOSIS — E78.01 FAMILIAL HYPERCHOLESTEROLEMIA: ICD-10-CM

## 2018-07-19 PROBLEM — N81.10 BLADDER PROLAPSE, FEMALE, ACQUIRED: Status: RESOLVED | Noted: 2017-12-28 | Resolved: 2018-07-19

## 2018-07-19 PROCEDURE — 99214 OFFICE O/P EST MOD 30 MIN: CPT | Performed by: FAMILY MEDICINE

## 2018-07-20 NOTE — ASSESSMENT & PLAN NOTE
Has been working on weight loss after recent surgery for bladder prolapse. She is now back to work and doing PT  Advised her on diet, exercise, weight loss.

## 2018-07-20 NOTE — PROGRESS NOTES
"Subjective:   Davina Barrios is a 47 y.o. female here today for evaluation and management of:     Renal cyst  Needs follow up imaging in June 2019    Hyperlipidemia  Mild elevation in LDL  Due for repeat check in dec  Advised on healthy diet, weight loss, exercise.   Results for DAVINA BARRIOS (MRN [id number]) as of 7/19/2018 16:58   Ref. Range 12/29/2017 09:41   Cholesterol,Tot Latest Ref Range: 100 - 199 mg/dL 176   Triglycerides Latest Ref Range: 0 - 149 mg/dL 82   HDL Latest Ref Range: >=40 mg/dL 45   LDL Latest Ref Range: <100 mg/dL 115 (H)       Obesity (BMI 30-39.9)  Has been working on weight loss after recent surgery for bladder prolapse. She is now back to work and doing PT  Advised her on diet, exercise, weight loss.          Current medicines (including changes today)  Current Outpatient Prescriptions   Medication Sig Dispense Refill   • tramadol (ULTRAM) 50 MG Tab Take 1 Tab by mouth 2 times a day as needed for up to 30 days. 60 Tab 0   • cyclobenzaprine (FLEXERIL) 5 MG tablet Take 1-2 Tabs by mouth 3 times a day as needed. 30 Tab 2   • Mirabegron ER (MYRBETRIQ) 25 MG TABLET SR 24 HR Take 1 Tab by mouth 2 Times a Day.     • varenicline (CHANTIX JH) 0.5 MG X 11 & 1 MG X 42 tablet Take as directed 0.5 mg daily for 3 days, 0.5 mg twice a day for 4 days then 1 mg twice daily for tobacco cessation. 56 Tab 3     No current facility-administered medications for this visit.      She  has a past medical history of Bowel habit changes (04/06/2018); Dental disorder (01-04-11); Hepatitis A (1997); Pain (01-04-11); and Urinary bladder disorder. She also has no past medical history of Breast cancer (HCC) or Clotting disorder (HCC).    ROS  No chest pain, no shortness of breath, no abdominal pain       Objective:     Blood pressure 122/68, pulse 85, temperature 36.8 °C (98.2 °F), resp. rate 16, height 1.651 m (5' 5\"), weight 84.8 kg (187 lb), last menstrual period 01/01/2011, SpO2 98 %. Body " mass index is 31.12 kg/m².   Physical Exam:  Constitutional: Alert, no distress.  Skin: Warm, dry, good turgor, no rashes in visible areas.  Eye: Equal, round and reactive, conjunctiva clear, lids normal.  ENMT: Lips without lesions, good dentition, oropharynx clear.  Neck: Trachea midline, no masses, no thyromegaly. No cervical or supraclavicular lymphadenopathy  Respiratory: Unlabored respiratory effort, lungs clear to auscultation, no wheezes, no ronchi.  Cardiovascular: Normal S1, S2, no murmur, no edema.  Abdomen: Soft, non-tender, no masses, no hepatosplenomegaly.  Psych: Alert and oriented x3, normal affect and mood.        Assessment and Plan:   The following treatment plan was discussed    1. Renal cyst  Recheck MRI renal cyst June 2019    2. Familial hypercholesterolemia  Recheck labs in dec  Advised on avoiding diet high in saturated fats.     3. Obesity (BMI 30-39.9)  Advised on diet and exercise.       Followup: No Follow-up on file.

## 2018-07-20 NOTE — ASSESSMENT & PLAN NOTE
Mild elevation in LDL  Due for repeat check in dec  Advised on healthy diet, weight loss, exercise.   Results for KRYSTEN BARRIOS (MRN [id number]) as of 7/19/2018 16:58   Ref. Range 12/29/2017 09:41   Cholesterol,Tot Latest Ref Range: 100 - 199 mg/dL 176   Triglycerides Latest Ref Range: 0 - 149 mg/dL 82   HDL Latest Ref Range: >=40 mg/dL 45   LDL Latest Ref Range: <100 mg/dL 115 (H)

## 2018-12-19 ENCOUNTER — OCCUPATIONAL MEDICINE (OUTPATIENT)
Dept: URGENT CARE | Facility: CLINIC | Age: 48
End: 2018-12-19
Payer: COMMERCIAL

## 2018-12-19 VITALS
RESPIRATION RATE: 16 BRPM | SYSTOLIC BLOOD PRESSURE: 124 MMHG | OXYGEN SATURATION: 98 % | WEIGHT: 175.8 LBS | HEART RATE: 82 BPM | TEMPERATURE: 98.7 F | HEIGHT: 65 IN | BODY MASS INDEX: 29.29 KG/M2 | DIASTOLIC BLOOD PRESSURE: 68 MMHG

## 2018-12-19 DIAGNOSIS — S66.912A WRIST STRAIN, LEFT, INITIAL ENCOUNTER: ICD-10-CM

## 2018-12-19 DIAGNOSIS — Z02.1 PRE-EMPLOYMENT DRUG SCREENING: ICD-10-CM

## 2018-12-19 LAB
AMP AMPHETAMINE: NORMAL
BREATH ALCOHOL COMMENT: NORMAL
COC COCAINE: NORMAL
INT CON NEG: NORMAL
INT CON POS: NORMAL
MET METHAMPHETAMINES: NORMAL
OPI OPIATES: NORMAL
PCP PHENCYCLIDINE: NORMAL
POC BREATHALIZER: 0 PERCENT (ref 0–0.01)
POC DRUG COMMENT 753798-OCCUPATIONAL HEALTH: NEGATIVE
THC: NORMAL

## 2018-12-19 PROCEDURE — 82075 ASSAY OF BREATH ETHANOL: CPT | Performed by: PHYSICIAN ASSISTANT

## 2018-12-19 PROCEDURE — 99213 OFFICE O/P EST LOW 20 MIN: CPT | Mod: 29 | Performed by: PHYSICIAN ASSISTANT

## 2018-12-19 PROCEDURE — 80305 DRUG TEST PRSMV DIR OPT OBS: CPT | Performed by: PHYSICIAN ASSISTANT

## 2018-12-19 ASSESSMENT — ENCOUNTER SYMPTOMS
FEVER: 0
SENSORY CHANGE: 1
CHILLS: 0
TINGLING: 0
FOCAL WEAKNESS: 0

## 2018-12-19 NOTE — ADDENDUM NOTE
Addended by: TRINIDAD, DENNIEL J. on: 12/19/2018 01:47 PM     Modules accepted: Jeaneth, SmartSet

## 2018-12-19 NOTE — LETTER
[facility]  [address], [address] [address]  Phone:  [phone] - Fax:  [phone]   [facility] Progress Report and Disability Certification  Date of Service: 2018   No Show:  No  Date / Time of Next Visit: 2018   Claim Information   Patient Name: Davina Jones  Claim Number:     Employer: [employer]  Date of Injury: 2018     Insurer / TPA: [employer]  ID / SSN:     Occupation: Associate  Diagnosis: The encounter diagnosis was Wrist strain, left, initial encounter.    Medical Information   Related to Industrial Injury? Yes    Subjective Complaints:  DOI: 18. Patient reports she was at work and picked up a case of [employer] when she felt a sharp pain in her left hand. She is having difficulty now with gripping and pain. She also complains of mild numbness in her left finger. She is right handed. She has a history of CTS. She tried ice prior to arriving today.    Objective Findings: Vitals reviewed.   Left HAND: skin without erythema, ecchymosis, or edema. Unable to fully  using left hand due to pain. Mild TTP over thenar eminence. Distal n/v intact. Finkelstein test positive.    Pre-Existing Condition(s):     Assessment:   Initial Visit    Status: Additional Care Required  Permanent Disability:No    Plan: Medication  Comments:OTC Ibuprofen. RICE. Thumb spicca splint     Diagnostics:      Comments:       Disability Information   Status: Released to Restricted Duty    From:  2018  Through: 2018 Restrictions are: Temporary   Physical Restrictions   Sitting:    Standing:    Stooping:    Bending:      Squatting:    Walking:    Climbing:    Pushin hrs/day   Pullin hrs/day Other:    Reaching Above Shoulder (L):   Reaching Above Shoulder (R):       Reaching Below Shoulder (L):    Reaching Below Shoulder (R):      Not to exceed Weight Limits   Carrying(hrs):   Weight Limit(lb): < or =  to 10 pounds  Comments:5 lbs restriction Lifting(hrs):   Weight  Limit(lb): < or = to 10 pounds  Comments:5 lb restriction   Comments: Restrictions limited to left hand    Repetitive Actions   Hands: i.e. Fine Manipulations from [place] hrs/day   Feet: i.e. Operating Foot Controls:     Driving / Operate Machinery:     Physician Name: Denis Sanchez P.A.-C. Physician Signature: DENIS Tran P.A.-C. e-Signature: Dr. Antonio Smith, Medical Director   Clinic Name / Location: [address], [address]  [place] [address] Clinic Phone Number: Dept: [phone]   Appointment Time: 1:00 Pm Visit Start Time: 1:19 PM   Check-In Time:  12:51 Pm Visit Discharge Time:  1:47 PM   Original-Treating Physician or Chiropractor    Page 2-Insurer/TPA    Page 3-Employer    Page 4-Employee

## 2018-12-19 NOTE — LETTER
"EMPLOYEE’S CLAIM FOR COMPENSATION/ REPORT OF INITIAL TREATMENT  FORM C-4    EMPLOYEE’S CLAIM - PROVIDE ALL INFORMATION REQUESTED   First Name  Davina Last Name  Robert Birthdate                    1970                Sex  female Claim Number   Home Address                                                                        [address] Age  48 y.o. 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 (if applicable)  Picked up a case of rockstar and put it into a box.    How did this injury or occupational disease occur? (Be specific an answer in detail. Use additional sheet if necessary)  Picked up a case of rockstar energy drinks and placed it into a box on a picking cart.Felt a sharp pain shoot through my wrist and it started throbbing.   If you believe that you have an occupational disease, when did you first have knowledge of the disability and it relationship to your employment?  n/a Witnesses to the Accident  none      Nature of Injury or Occupational " Disease  Workers' Compensation  Part(s) of Body Injured or Affected  Wrist (L) and Hand (L), ,     I certify that the above is true and correct to the best of my knowledge and that I have provided this information in order to obtain the benefits of [place]  I hereby authorize any physician, chiropractor, surgeon, practitioner, or other person, any hospital, including [facility] or [facility], any medical service organization, any insurance company, or other institution or organization to release to each other, any medical or other information, including benefits paid or payable, pertinent to this injury or disease, except information relative to diagnosis, treatment and/or counseling for AIDS, psychological conditions, alcohol or controlled substances, for which I must give specific authorization.  A Photostat of this authorization shall be as valid as the original.     Date   Place   Employee’s Signature   THIS REPORT MUST BE COMPLETED AND MAILED WITHIN 3 WORKING DAYS OF TREATMENT   Place  [facility]  Name of Facility  [facility]   Date  12/19/2018 Diagnosis  (S66.912A) Wrist strain, left, initial encounter Is there evidence the injured employee was under the influence of alcohol and/or another controlled substance at the time of accident?   Hour  1:19 PM Description of Injury or Disease  The encounter diagnosis was Wrist strain, left, initial encounter. No   Treatment  RICE. OTC Ibuprofen prn. Thumb spica splint.  Have you advised the patient to remain off work five days or more? No   X-Ray Findings      If Yes   From Date  To Date      From information given by the employee, together with medical evidence, can you directly connect this injury or occupational disease as job incurred?  Yes If No Full Duty  No Modified Duty  Yes   Is additional medical care by a physician  "indicated?  Yes If Modified Duty, Specify any Limitations / Restrictions  See D-39 for restrictions   Do you know of any previous injury or disease contributing to this condition or occupational disease?                            No   Date  12/19/2018 Print Doctor’s Name Denis Sanchez P.A.-C. I certify the employer’s copy of  this form was mailed on:   Address  [address], [address] Insurer’s Use Only     [place] Zip  [address]    Provider’s Tax ID Number  [id number] Telephone  Dept: [phone]        e-DENIS Greenfield P.A.-C.   e-Signature: Dr. Antonio Smith, Medical Director Degree  MARY        ORIGINAL-TREATING PHYSICIAN OR CHIROPRACTOR    PAGE 2-INSURER/TPA    PAGE 3-EMPLOYER    PAGE 4-EMPLOYEE             Form C-4 (rev10/07)              BRIEF DESCRIPTION OF RIGHTS AND BENEFITS  (Pursuant to [place] 616C.050)    Notice of Injury or Occupational Disease (Incident Report Form C-1): If an injury or occupational disease (OD) arises out of and in the  course of employment, you must provide written notice to your employer as soon as practicable, but no later than 7 days after the accident or  OD. Your employer shall maintain a sufficient supply of the required forms.    Claim for Compensation (Form C-4): If medical treatment is sought, the form C-4 is available at the place of initial treatment. A completed  \"Claim for Compensation\" (Form C-4) must be filed within 90 days after an accident or OD. The treating physician or chiropractor must,  within 3 working days after treatment, complete and mail to the employer, the employer's insurer and third-party , the Claim for  Compensation.    Medical Treatment: If you require medical treatment for your on-the-job injury or OD, you may be required to select a physician or  chiropractor from a list provided by your workers’ compensation insurer, if it has contracted with an Organization for Managed Care (MCO) or  Preferred Provider " Organization (PPO) or providers of health care. If your employer has not entered into a contract with an MCO or PPO, you  may select a physician or chiropractor from the Panel of Physicians and Chiropractors. Any medical costs related to your industrial injury or  OD will be paid by your insurer.    Temporary Total Disability (TTD): If your doctor has certified that you are unable to work for a period of at least 5 consecutive days, or 5  cumulative days in a 20-day period, or places restrictions on you that your employer does not accommodate, you may be entitled to TTD  compensation.    Temporary Partial Disability (TPD): If the wage you receive upon reemployment is less than the compensation for TTD to which you are  entitled, the insurer may be required to pay you TPD compensation to make up the difference. TPD can only be paid for a maximum of 24  months.    Permanent Partial Disability (PPD): When your medical condition is stable and there is an indication of a PPD as a result of your injury or  OD, within 30 days, your insurer must arrange for an evaluation by a rating physician or chiropractor to determine the degree of your PPD. The  amount of your PPD award depends on the date of injury, the results of the PPD evaluation and your age and wage.    Permanent Total Disability (PTD): If you are medically certified by a treating physician or chiropractor as permanently and totally disabled  and have been granted a PTD status by your insurer, you are entitled to receive monthly benefits not to exceed 66 2/3% of your average  monthly wage. The amount of your PTD payments is subject to reduction if you previously received a PPD award.    Vocational Rehabilitation Services: You may be eligible for vocational rehabilitation services if you are unable to return to the job due to a  permanent physical impairment or permanent restrictions as a result of your injury or occupational disease.    Transportation and Per Nickie  Reimbursement: You may be eligible for travel expenses and per laina associated with medical treatment.    Reopening: You may be able to reopen your claim if your condition worsens after claim closure.    Appeal Process: If you disagree with a written determination issued by the insurer or the insurer does not respond to your request, you may  appeal to the Department of Administration, , by following the instructions contained in your determination letter. You must  appeal the determination within 70 days from the date of the determination letter at [address], [address], [place], [place]  [address], or [address], [address], [place], [place] [address]. If you disagree with the  decision, you may appeal to the  Department of Administration, . You must file your appeal within 30 days from the date of the  decision  letter at [address], [address], [place], [place] [address], or [address], [address], [place], [place] [address]. If you  disagree with a decision of an , you may file a petition for judicial review with the District Court. You must do so within 30  days of the Appeal Officer’s decision. You may be represented by an  at your own expense or you may contact the [facility] for possible  representation.    [place]  for Injured Workers (NAIW): If you disagree with a  decision, you may request that NAIW represent you  without charge at an  Hearing. For information regarding denial of benefits, you may contact the [facility] at: [address], [address], [place] [address], [phone], or [address] [facility] [address], [place] [address], [phone]    To File a Complaint with the Division: If you wish to file a complaint with the  of the Division of Industrial Relations (DIR),  please contact the Workers’ Compensation Section, [address], [address], [place], [place] [address], telephone ([phone]) [phone], or  [address], [address], [place], [place] [address], telephone [phone].    For assistance with Workers’ Compensation Issues: you may contact the Office of the [facility] Consumer Health Assistance, [address] [place] [address], [address], [place], [place] [address], Toll Free 1-369.884.2730, Web site: http://govcha.[facility].nv., E-mail  [email or web address].[email or web address].                                                                                                                                                                                                                                   __________________________________________________________________                                                                   _________________                Employee Name / Signature                                                                                                                                                       Date                                                                                                                                                                                                     D-2 (rev. 10/07)

## 2018-12-19 NOTE — PROGRESS NOTES
Subjective:      Davina Jones is a 48 y.o. female who presents with Wrist Injury (x Lt wrist injury)      DOI: 12/19/18. Patient reports she was at work and picked up a case of [employer] when she felt a sharp pain in her left hand. She is having difficulty now with gripping and pain. She also complains of mild numbness in her left finger. She is right handed. She has a history of CTS. She tried ice prior to arriving today.      Wrist Injury    Pertinent negatives include no tingling.       Past Medical History:   Diagnosis Date   • Bowel habit changes 04/06/2018    constipation and diarrhea intervals   • Dental disorder 01-04-11    cavities and broken teeth   • Hepatitis A 1997   • Pain 01-04-11    pelvic area, 5/10   • Urinary bladder disorder        Past Surgical History:   Procedure Laterality Date   • RECTOCELE REPAIR  4/13/2018    Procedure: RECTOCELE REPAIR and perinoeplasty;  Surgeon: Jesus Santana M.D.;  Location: SURGERY [facility];  Service: Urology   • VAGINAL HYSTERECTOMY TOTAL  1/7/2011    Performed by ANABELLE CORRALES at SURGERY SAME DAY [facility] ORS   • CYSTOSCOPY  1/7/2011    Performed by ANABELLE CORRALES at SURGERY SAME DAY [facility] ORS   • TUBAL LIGATION  1999   • TONSILLECTOMY  1998   • MAMMOPLASTY REDUCTION  1995   • EYE SURGERY      lasik   • HYSTERECTOMY, VAGINAL      still have both ovaries   • PB REDUCTION OF LARGE BREAST         Family History   Problem Relation Age of Onset   • Heart Disease Mother    • Hyperlipidemia Mother    • Hypertension Mother    • Lung Disease Father         copd   • Heart Disease Father    • Cancer Sister         lung, smoker   • Diabetes Maternal Grandmother    • Cancer Paternal Grandfather         leukemia       No Known Allergies    Medications, Allergies, and current problem list reviewed today in Epic    Review of Systems   Constitutional: Negative for chills, fever and malaise/fatigue.   Musculoskeletal: Positive for joint pain (left  "wrist pain ).   Skin: Negative for rash.   Neurological: Positive for sensory change (numbness left thumb). Negative for tingling and focal weakness.     All other systems reviewed and are negative.        Objective:     /68 (BP Location: Left arm, Patient Position: Sitting, BP Cuff Size: Large adult)   Pulse 82   Temp 37.1 °C (98.7 °F) (Temporal)   Resp 16   Ht 1.651 m (5' 5\")   Wt 79.7 kg (175 lb 12.8 oz)   LMP 01/01/2011   SpO2 98%   BMI 29.25 kg/m²      Physical Exam   Constitutional: She is oriented to person, place, and time. She appears well-developed and well-nourished. No distress.   Pulmonary/Chest: Effort normal. No respiratory distress.   Neurological: She is alert and oriented to person, place, and time. No cranial nerve deficit.   Psychiatric: Her behavior is normal. Judgment and thought content normal.       Vitals reviewed.   Left HAND: skin without erythema, ecchymosis, or edema. Unable to fully  using left hand due to pain. Mild TTP over thenar eminence. Distal n/v intact. Finkelstein test positive.        Assessment/Plan:     1. Wrist strain, left, initial encounter    MATT,  OTC Ibuprofen  Thumb Spica Splint.  Restrictions per D-38.  RTC 1 week or sooner prn.     Differential diagnoses, Supportive care, and indications for immediate follow-up discussed with patient.   Instructed to return to clinic or nearest emergency department for any change in condition, further concerns, or worsening of symptoms.    The patient demonstrated a good understanding and agreed with the treatment plan.    Jaycee Sanchez P.A.-C.        "

## 2018-12-26 ENCOUNTER — OCCUPATIONAL MEDICINE (OUTPATIENT)
Dept: URGENT CARE | Facility: CLINIC | Age: 48
End: 2018-12-26
Payer: COMMERCIAL

## 2018-12-26 VITALS
DIASTOLIC BLOOD PRESSURE: 80 MMHG | HEART RATE: 79 BPM | TEMPERATURE: 98.2 F | RESPIRATION RATE: 16 BRPM | WEIGHT: 175 LBS | HEIGHT: 64 IN | OXYGEN SATURATION: 95 % | SYSTOLIC BLOOD PRESSURE: 118 MMHG | BODY MASS INDEX: 29.88 KG/M2

## 2018-12-26 DIAGNOSIS — S66.912A STRAIN OF LEFT WRIST, INITIAL ENCOUNTER: ICD-10-CM

## 2018-12-26 DIAGNOSIS — M77.8 THUMB TENDONITIS: ICD-10-CM

## 2018-12-26 PROCEDURE — 99214 OFFICE O/P EST MOD 30 MIN: CPT | Mod: 29 | Performed by: PHYSICIAN ASSISTANT

## 2018-12-26 RX ORDER — KETOROLAC TROMETHAMINE 30 MG/ML
60 INJECTION, SOLUTION INTRAMUSCULAR; INTRAVENOUS ONCE
Status: COMPLETED | OUTPATIENT
Start: 2018-12-26 | End: 2018-12-26

## 2018-12-26 RX ADMIN — KETOROLAC TROMETHAMINE 60 MG: 30 INJECTION, SOLUTION INTRAMUSCULAR; INTRAVENOUS at 11:01

## 2018-12-26 ASSESSMENT — ENCOUNTER SYMPTOMS
CHILLS: 0
FEVER: 0
MYALGIAS: 1

## 2018-12-26 ASSESSMENT — PAIN SCALES - GENERAL: PAINLEVEL: 8=MODERATE-SEVERE PAIN

## 2018-12-26 NOTE — LETTER
[facility]  [address] [facility] [address], [address] [address]  Phone:  [phone] - Fax:  [phone]   [facility] Progress Report and Disability Certification  Date of Service: 12/26/2018   No Show:  No  Date / Time of Next Visit: 1/2/2019   Claim Information   Patient Name: Davina Jones  Claim Number:     Employer: [employer]  Date of Injury: 12/19/2018     Insurer / TPA: [employer]  ID / SSN:     Occupation: Associate  Diagnosis: Diagnoses of Thumb tendonitis and Strain of left wrist, initial encounter were pertinent to this visit.    Medical Information   Related to Industrial Injury?   Comments:Patient has a history of carpal tunnel and tendinitis of the left thumb.  She seems to be experiencing this now.  Her work-related duties likely exacerbated    Subjective Complaints:  Davina Jones is a 48 y.o. female who presents with Wrist Injury (x Lt wrist injury)        DOI: 12/19/18. Patient reports she was at work and picked up a case of [employer] when she felt a sharp pain in her left hand. She is having difficulty now with gripping and pain. She also complains of mild numbness in her left finger. She is right handed. She has a history of CTS and she also has a history of tendinitis in the left thumb.  This is her second visit to the clinic.  Wearing the brace and taking ibuprofen has not helped.  She states her symptoms are worse she feels numbness and some tingling in the first 3 digits of her hand.  She also has weakness with     Objective Findings: General: Pleasant woman no acute distress  Vitals are listed unremarkable  Musculo keletal: Patient has positive Finkelstein positive Tinel's and positive Phalen's of the left hand.  Neurovascularly intact with a 2-second cap refill good distal pulses and normal sensation   Pre-Existing Condition(s): Carpal tunnel syndrome and tendinitis of the left thumb   Assessment:    Condition Worsened    Status: Additional Care Required  Permanent Disability:No    Plan:   Comments:Steroid pack sent to the pharmacy.  Toradol 60 mg IM x1 given here.  Patient put in a thumb spica    Diagnostics:      Comments:       Disability Information   Status: Released to Restricted Duty    From:  12/26/2018  Through: 1/2/2019 Restrictions are: Temporary   Physical Restrictions   Sitting:    Standing:    Stooping:    Bending:      Squatting:    Walking:    Climbing:    Pushing:      Pulling:    Other:    Reaching Above Shoulder (L):   Reaching Above Shoulder (R):       Reaching Below Shoulder (L):    Reaching Below Shoulder (R):      Not to exceed Weight Limits   Carrying(hrs):   Weight Limit(lb): < or = to 10 pounds Lifting(hrs):   Weight  Limit(lb):     Comments: Wear your brace, thumb spica, at work...  Will give patient a referral to sports medicine specialist as patient likely will benefit from steroid injection.    Repetitive Actions   Hands: i.e. Fine Manipulations from Grasping: < or = to 4 hrs/day   Feet: i.e. Operating Foot Controls:     Driving / Operate Machinery:     Physician Name: Amna Callejas P.A.-C. Physician Signature: AMNA De Paz P.A.-C. e-Signature: Dr. Antonio Smith, Medical Director   Clinic Name / Location: [address], [address]  [facility] [place] [address] Clinic Phone Number: Dept: [phone]   Appointment Time: 10:30 Am Visit Start Time: 10:32 AM   Check-In Time:  10:19 Am Visit Discharge Time: 11:07 Am    Original-Treating Physician or Chiropractor    Page 2-Insurer/TPA    Page 3-Employer    Page 4-Employee

## 2018-12-26 NOTE — PROGRESS NOTES
"Subjective:      Davina Jones is a 48 y.o. female who presents with Follow-Up (WC DOI: 12/19/18 L Wrist Sprain worse)      Davina Jones is a 48 y.o. female who presents with Wrist Injury (x Lt wrist injury)        DOI: 12/19/18. Patient reports she was at work and picked up a case of [employer] when she felt a sharp pain in her left hand. She is having difficulty now with gripping and pain. She also complains of mild numbness in her left finger. She is right handed. She has a history of CTS and she also has a history of tendinitis in the left thumb.  This is her second visit to the clinic.  Wearing the brace and taking ibuprofen has not helped.  She states her symptoms are worse she feels numbness and some tingling in the first 3 digits of her hand.  She also has weakness with       HPI    Review of Systems   Constitutional: Negative for chills and fever.   Musculoskeletal: Positive for joint pain and myalgias.          Objective:     /80 (BP Location: Right arm, Patient Position: Sitting, BP Cuff Size: Adult)   Pulse 79   Temp 36.8 °C (98.2 °F) (Temporal)   Resp 16   Ht 1.626 m (5' 4\")   Wt 79.4 kg (175 lb)   LMP 01/01/2011   SpO2 95%   BMI 30.04 kg/m²      Physical Exam    General: Pleasant woman no acute distress  Vitals are listed unremarkable  Musculo keletal: Patient has positive Finkelstein positive Tinel's and positive Phalen's of the left hand.  Neurovascularly intact with a 2-second cap refill good distal pulses and normal sensation       Assessment/Plan:     1. Thumb tendonitis    - ketorolac (TORADOL) injection 60 mg; 2 mL by Intramuscular route Once.  - REFERRAL TO SPORTS MEDICINE    2. Strain of left wrist, initial encounter    - ketorolac (TORADOL) injection 60 mg; 2 mL by Intramuscular route Once.  - REFERRAL TO SPORTS MEDICINE      Worker's Compensation D 39 form filled out with modified job duty for the week.  I am giving her a referral to see our " sports medicine specialist as she has symptoms suggestive of de Quervain's tenosynovitis.  She has had this in the past.  Likely job duties or exacerbating an injury she has had

## 2019-01-02 ENCOUNTER — OCCUPATIONAL MEDICINE (OUTPATIENT)
Dept: URGENT CARE | Facility: CLINIC | Age: 49
End: 2019-01-02
Payer: COMMERCIAL

## 2019-01-02 VITALS
TEMPERATURE: 97 F | WEIGHT: 180 LBS | DIASTOLIC BLOOD PRESSURE: 88 MMHG | BODY MASS INDEX: 30.73 KG/M2 | RESPIRATION RATE: 16 BRPM | OXYGEN SATURATION: 96 % | HEIGHT: 64 IN | SYSTOLIC BLOOD PRESSURE: 122 MMHG | HEART RATE: 74 BPM

## 2019-01-02 DIAGNOSIS — M77.8 THUMB TENDONITIS: ICD-10-CM

## 2019-01-02 DIAGNOSIS — S66.912D STRAIN OF LEFT WRIST, SUBSEQUENT ENCOUNTER: ICD-10-CM

## 2019-01-02 PROCEDURE — 99214 OFFICE O/P EST MOD 30 MIN: CPT | Mod: 29 | Performed by: NURSE PRACTITIONER

## 2019-01-02 RX ORDER — PREDNISONE 20 MG/1
20 TABLET ORAL 2 TIMES DAILY
Qty: 10 TAB | Refills: 0 | Status: SHIPPED | OUTPATIENT
Start: 2019-01-02 | End: 2019-01-07

## 2019-01-02 NOTE — LETTER
[facility]  [address] [facility], [address] [place] [address] [address]  Phone:  [phone] - Fax:  [phone]   [facility] Progress Report and Disability Certification  Date of Service: 1/2/2019   No Show:  No  Date / Time of Next Visit:     Claim Information   Patient Name: Davina Jones  Claim Number:     Employer: [employer]  Date of Injury: 12/19/2018     Insurer / TPA: [employer]  ID / SSN:     Occupation: Associate  Diagnosis: Diagnoses of Thumb tendonitis and Strain of left wrist, subsequent encounter were pertinent to this visit.    Medical Information   Related to Industrial Injury? Yes  Comments:Acute strain likely aggravated baseline condition.    Subjective Complaints:  DOI 12/19/18 3rd visit.  Patient reports she was at work and picked up a case of [employer] energy drink (24 cans).  She had to lift and reach to get it into another box and then flex her wrists down to put the case in the box.  She felt an immediate sharp pain in the left hand, particularly the left thumb and wrist.  Pain was 10/10.  She was evaluated in urgent care for strain and tendonitis.  She has been wearing her wrist splint, which does help.  She is taking ibuprofen  mg 4-5 times per day. She rates pain 4/10.  At last visit, steroids were discussed, but do not appear to have been prescribed.  She was referred to sports medicine and that referral has been approved.  She has a history of Carpal tunnel syndrome of the left hand (chronic) but well controlled with activity avoidance.  She had a lot of pain when working in another department, but has done well with no significant pain in the new department at work.  She has a history of tendonitis of the left thumb that has responded well to steroids in the past.  She notes tingling and pain in the thumb, and to a lesser degree in the 2nd and 3rd finger.  Reports weak  and limited ROM.    Objective Findings:  Patient is alert, oriented and in no acute distress.  Left hand and wrist are warm, dry, and intact with no bruising, erythema, rash or lesion.  Mild generalized swelling of the hand noted.  Diffuse TTP with wincing and guarding of the wrist and left thumb.  ROM of the wrist limited with pain on any movement in any direction.  Positive Phalen's, Positive Tinel's, Positive Finkelstein.  NV and sensation grossly intact.  Cap refill < 2 seconds.  Limited  due to pain.        Pre-Existing Condition(s): Known carpal tunnel syndrome and history of tendonitis.   Assessment:   Condition Same    Status: Additional Care Required  Comments:Follow up with sports medicine as referred.  Permanent Disability:No    Plan: Medication  Comments:OTC ibuprofen not to exceed 800 mg TID prn pain.  OTC tylenol 500 mg QID prn pain.  Prednisone as prescribed.  wear wrist splint.      Diagnostics:      Comments:       Disability Information   Status: Released to Restricted Duty    From:  1/2/2019  Through:   Restrictions are: Temporary  Comments:Restrictions in effect until further evaluation and care with sports medicine.   Physical Restrictions   Sitting:    Standing:    Stooping:    Bending:      Squatting:    Walking:    Climbing:    Pushing:      Pulling:    Other:    Reaching Above Shoulder (L):   Reaching Above Shoulder (R):       Reaching Below Shoulder (L):    Reaching Below Shoulder (R):      Not to exceed Weight Limits   Carrying(hrs):   Weight Limit(lb): < or = to 10 pounds  Comments:Restrictions apply to left hand. Lifting(hrs):   Weight  Limit(lb): < or = to 10 pounds  Comments:Restrictions apply to left hand.   Comments: Wear wrist splint with thumb spica as previously provided in urgent care.     Repetitive Actions   Hands: i.e. Fine Manipulations from Grasping: < or = to 4 hrs/day  Comments:Restrictions apply to left hand only.   Feet: i.e. Operating Foot Controls:     Driving / Operate Machinery:     Physician Name:  NOE Tabares Physician Signature: NAIDA Omer e-Signature: Dr. Antonio Smith, Medical Director   Clinic Name / Location: [address], [address]  [place] [address] Clinic Phone Number: Dept: [phone]   Appointment Time: 10:30 Am Visit Start Time: 10:16 AM   Check-In Time:  10:12 Am Visit Discharge Time:  10:48 AM   Original-Treating Physician or Chiropractor    Page 2-Insurer/TPA    Page 3-Employer    Page 4-Employee

## 2019-01-04 ASSESSMENT — ENCOUNTER SYMPTOMS
FEVER: 0
CHILLS: 0

## 2019-01-04 NOTE — PROGRESS NOTES
"Subjective:      Davina Jones is a 48 y.o. female who presents with Wrist Injury (WC Fv, Pt states injury has slightly improved)      DOI 12/19/18 3rd visit.  Patient reports she was at work and picked up a case of [employer] energy drink (24 cans).  She had to lift and reach to get it into another box and then flex her wrists down to put the case in the box.  She felt an immediate sharp pain in the left hand, particularly the left thumb and wrist.  Pain was 10/10.  She was evaluated in urgent care for strain and tendonitis.  She has been wearing her wrist splint, which does help.  She is taking ibuprofen  mg 4-5 times per day. She rates pain 4/10.  At last visit, steroids were discussed, but do not appear to have been prescribed.  She was referred to sports medicine and that referral has been approved.  She has a history of Carpal tunnel syndrome of the left hand (chronic) but well controlled with activity avoidance.  She had a lot of pain when working in another department, but has done well with no significant pain in the new department at work.  She has a history of tendonitis of the left thumb that has responded well to steroids in the past.  She notes tingling and pain in the thumb, and to a lesser degree in the 2nd and 3rd finger.  Reports weak  and limited ROM.      HPI    Review of Systems   Constitutional: Negative for chills, fever and malaise/fatigue.   Musculoskeletal: Positive for joint pain.     Medications, Allergies, and current problem list reviewed today in Epic     Objective:     /88   Pulse 74   Temp 36.1 °C (97 °F) (Temporal)   Resp 16   Ht 1.626 m (5' 4\")   Wt 81.6 kg (180 lb)   LMP 01/01/2011   SpO2 96%   BMI 30.90 kg/m²      Physical Exam    Patient is alert, oriented and in no acute distress.  Left hand and wrist are warm, dry, and intact with no bruising, erythema, rash or lesion.  Mild generalized swelling of the hand noted.  Diffuse TTP with wincing " and guarding of the wrist and left thumb.  ROM of the wrist limited with pain on any movement in any direction.  Positive Phalen's, Positive Tinel's, Positive Finkelstein.  NV and sensation grossly intact.  Cap refill < 2 seconds.  Limited  due to pain.            Assessment/Plan:     1. Thumb tendonitis  - predniSONE (DELTASONE) 20 MG Tab; Take 1 Tab by mouth 2 times a day for 5 days.  Dispense: 10 Tab; Refill: 0    2. Strain of left wrist, subsequent encounter    Discussed exam findings with patient.  Follow up with sports medicine as referred after completing course of oral steroids.  Prednisone as prescribed.  OTC analgesics prn pain.  Avoid analgesic overuse; do not exceed dose per package insert.  Wear wrist brace.  Work restrictions per D- 39.  Patient verbalized understanding of and agreed with plan of care.

## 2019-01-10 ENCOUNTER — OCCUPATIONAL MEDICINE (OUTPATIENT)
Dept: MEDICAL GROUP | Facility: CLINIC | Age: 49
End: 2019-01-10
Payer: COMMERCIAL

## 2019-01-10 VITALS
HEIGHT: 64 IN | SYSTOLIC BLOOD PRESSURE: 122 MMHG | BODY MASS INDEX: 30.73 KG/M2 | DIASTOLIC BLOOD PRESSURE: 82 MMHG | WEIGHT: 180 LBS | RESPIRATION RATE: 18 BRPM | HEART RATE: 100 BPM | TEMPERATURE: 98.8 F | OXYGEN SATURATION: 94 %

## 2019-01-10 DIAGNOSIS — M25.532 LEFT WRIST PAIN: ICD-10-CM

## 2019-01-10 PROCEDURE — 99203 OFFICE O/P NEW LOW 30 MIN: CPT | Performed by: FAMILY MEDICINE

## 2019-01-10 RX ORDER — IBUPROFEN 200 MG
200 TABLET ORAL EVERY 6 HOURS PRN
COMMUNITY

## 2019-01-10 NOTE — LETTER
[facility]  [address]  [place] [address]  Phone:  [phone] - Fax:  [phone]   [facility] Progress Report and Disability Certification  Date of Service: 1/10/2019   No Show:  No  Date / Time of Next Visit:     Claim Information   Patient Name: Davina Jones  Claim Number:     Employer: [employer]  Date of Injury:      Insurer / TPA: Srinivas  ID / SSN:     Occupation:   Diagnosis: The encounter diagnosis was Left wrist pain.    Medical Information   Related to Industrial Injury? No  Comments:unclear mechanism of injury, occurred while transferring a case of softdrink    Subjective Complaints:   DOI 12/19/18 3rd visit.  Work-related injury, Patient reports she was at work and picked up a case of [employer] energy drink (24 cans) and felt sharp wrist pain.    She felt a sudden sharp pain in the LEFT hand, particularly the LEFT thumb and wrist.  No radiation unless she moves the wrist, which can worsen pain and occasionally cause pain to radiate up to her elbow.  Wrist splint helps.  She is taking ibuprofen OTC which helps some. She rates pain 4/10.   She has a history of Carpal tunnel syndrome BILATERALLY but well controlled with activity avoidance.  She had a lot of pain when working in another department, but has done well with no significant pain in the new department at work.  Denies prior injuries or other issues with the wrist. 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   Objective Findings: Hand exam   NAD  Alert and oriented    BILATERAL ELBOW exam.  Range of motion intact.  No swelling. No tenderness the medial or lateral epicondyle.  Begum test NEGATIVE.   BILATERAL WRIST exam Range of motion slightly limited on the LEFT compared to right.  POSITIVE tenderness along LEFT scaphoid, along the LEFT abductor pollicis longus tendon and at the CMC joint on the  LEFT compared to right, otherwise there is no TFCC insertion, distal radius or distal ulna.  Tinel's testing is NEGATIVE.  The hand is otherwise neurovascularly intact   Pre-Existing Condition(s): POSITIVE prior history of carpal tunnel, although her current symptoms do not seem to be related to carpal tunnel   Assessment:   Condition Same    Status: Additional Care Required  Permanent Disability:No    Plan: Medication  Comments:Continue ibuprofen    Diagnostics: X-ray  Comments:X-ray ordered    Comments:  X-ray not available in the office on January 10, 2019    Disability Information   Status: Released to Restricted Duty    From:     Through:   Restrictions are:     Physical Restrictions   Sitting:    Standing:    Stooping:    Bending:      Squatting:    Walking:    Climbing:    Pushing:      Pulling:    Other:    Reaching Above Shoulder (L):   Reaching Above Shoulder (R):       Reaching Below Shoulder (L):    Reaching Below Shoulder (R):      Not to exceed Weight Limits   Carrying(hrs):   Weight Limit(lb): < or = to 10 pounds Lifting(hrs):   Weight  Limit(lb):     Comments:      Repetitive Actions   Hands: i.e. Fine Manipulations from Grasping: < or = to 4 hrs/day   Feet: i.e. Operating Foot Controls:     Driving / Operate Machinery:     Physician Name: Home Marc M.D. Physician Signature: HOME Noble M.D. e-Signature: Dr. Antonio Smith, Medical Director   Clinic Name / Location: [address] [address] Clinic Phone Number: Dept: [phone]   Appointment Time: 1:30 Pm Visit Start Time: 1:03 PM   Check-In Time:  12:55 Pm Visit Discharge Time: 2:00 Pm    Original-Treating Physician or Chiropractor    Page 2-Insurer/TPA    Page 3-Employer    Page 4-Employee

## 2019-01-10 NOTE — PROGRESS NOTES
"Subjective:      Davina Jones is a 48 y.o. female who presents with Work-Related Injury (WC L wrist injury)       DOI 12/19/18 3rd visit.  Work-related injury, Patient reports she was at work and picked up a case of [employer] energy drink (24 cans) and felt sharp wrist pain.    She felt a sudden sharp pain in the LEFT hand, particularly the LEFT thumb and wrist.  No radiation unless she moves the wrist, which can worsen pain and occasionally cause pain to radiate up to her elbow.  Wrist splint helps.  She is taking ibuprofen OTC which helps some. She rates pain 4/10.   She has a history of Carpal tunnel syndrome BILATERALLY but well controlled with activity avoidance.  She had a lot of pain when working in another department, but has done well with no significant pain in the new department at work.  Denies prior injuries or other issues with the wrist. 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     HPI    ROS       Objective:     /82 (BP Location: Right arm, Patient Position: Sitting, BP Cuff Size: Adult)   Pulse 100   Temp 37.1 °C (98.8 °F) (Temporal)   Resp 18   Ht 1.626 m (5' 4\")   Wt 81.6 kg (180 lb)   LMP 01/01/2011   SpO2 94%   BMI 30.90 kg/m²      Physical Exam    Hand exam   NAD  Alert and oriented    BILATERAL ELBOW exam.  Range of motion intact.  No swelling. No tenderness the medial or lateral epicondyle.  Begum test NEGATIVE.   BILATERAL WRIST exam Range of motion slightly limited on the LEFT compared to right.  POSITIVE tenderness along LEFT scaphoid, along the LEFT abductor pollicis longus tendon and at the CMC joint on the LEFT compared to right, otherwise there is no TFCC insertion, distal radius or distal ulna.  Tinel's testing is NEGATIVE.  The hand is otherwise neurovascularly intact       Assessment/Plan:     1. Left wrist pain  DX-WRIST-COMPLETE 3+ LEFT      X-ray not available in the office on January " 10, 2019     Date of injury December 19, 2018  X-ray ordered  Consider possible underlying CMC arthritis versus de Quervain's tenosynovitis  Continue thumb spica splint    Follow-up in 2 weeks to see how she is doing  Continue restrictions of no lifting greater than 10 pounds and limited fine motor movements with the LEFT hand to less than 4 hours per shift

## 2019-01-18 ENCOUNTER — APPOINTMENT (OUTPATIENT)
Dept: URGENT CARE | Facility: PHYSICIAN GROUP | Age: 49
End: 2019-01-18
Payer: COMMERCIAL

## 2019-01-18 ENCOUNTER — APPOINTMENT (OUTPATIENT)
Dept: RADIOLOGY | Facility: IMAGING CENTER | Age: 49
End: 2019-01-18
Attending: FAMILY MEDICINE
Payer: COMMERCIAL

## 2019-01-18 DIAGNOSIS — M25.532 LEFT WRIST PAIN: ICD-10-CM

## 2019-01-18 PROCEDURE — 73110 X-RAY EXAM OF WRIST: CPT | Mod: 26,LT | Performed by: FAMILY MEDICINE

## 2019-02-28 ENCOUNTER — OCCUPATIONAL MEDICINE (OUTPATIENT)
Dept: MEDICAL GROUP | Facility: CLINIC | Age: 49
End: 2019-02-28
Payer: COMMERCIAL

## 2019-02-28 VITALS
DIASTOLIC BLOOD PRESSURE: 76 MMHG | RESPIRATION RATE: 18 BRPM | TEMPERATURE: 97.9 F | BODY MASS INDEX: 30.73 KG/M2 | SYSTOLIC BLOOD PRESSURE: 118 MMHG | OXYGEN SATURATION: 95 % | WEIGHT: 180 LBS | HEART RATE: 96 BPM | HEIGHT: 64 IN

## 2019-02-28 DIAGNOSIS — M25.532 LEFT WRIST PAIN: ICD-10-CM

## 2019-02-28 DIAGNOSIS — S63.8X2D CMC (CARPOMETACARPAL JOINT) SPRAINS, LEFT, SUBSEQUENT ENCOUNTER: ICD-10-CM

## 2019-02-28 PROCEDURE — 99213 OFFICE O/P EST LOW 20 MIN: CPT | Performed by: FAMILY MEDICINE

## 2019-02-28 NOTE — LETTER
[facility]  [address] [address]  Phone:  [phone] - Fax:  [phone]   [facility] Progress Report and Disability Certification  Date of Service: 2/28/2019   No Show:  No  Date / Time of Next Visit: 3/21/2019   Claim Information   Patient Name: Davina Jones  Claim Number:     Employer: [email or web address]  Date of Injury: 12/19/2018     Insurer / TPA: [employer]  ID / SSN:     Occupation: Associate  Diagnosis: Diagnoses of Left wrist pain and CMC (carpometacarpal joint) sprains, left, subsequent encounter were pertinent to this visit.    Medical Information   Related to Industrial Injury? No  Comments:no specific injury identified, pain started when lifting a 24 case of energy drinks    Subjective Complaints:  DOI 12/19/18 4th visit.  Work-related injury, Patient reports she was at work and picked up a case of [employer] energy drink (24 cans) and felt sharp wrist pain.    Pain is 90% improved.  Sharp pain is now GONE, occasional ache in the LEFT hand/wrist, volar wrist and base of thumb.  No more radiation of pain.  Wrist splint can feel uncomfortable.  She is still taking ibuprofen OTC. She has a prior history of Carpal tunnel syndrome BILATERALLY but well controlled and this feels different to her.  Currently still on restrictions   Objective Findings: POSITIVE tenderness at the CMC joint on the LEFT compared to right, NO longer having tenderness along LEFT scaphoid or abductor pollicis, otherwise there is no TFCC insertion, distal radius or distal ulna.  Tinel's testing is NEGATIVE.  The hand is otherwise neurovascularly intact   Pre-Existing Condition(s): History of carpel tunnel   Assessment:   Condition Improved    Status: Additional Care Required  Permanent Disability:No    Plan: Medication  Comments:ibuprofen OTC    Diagnostics: X-ray    Comments:       Disability Information   Status: Released to  Restricted Duty    From:  2/28/2019  Through: 3/21/2019 Restrictions are: Temporary   Physical Restrictions   Sitting:    Standing:    Stooping:    Bending:      Squatting:    Walking:    Climbing:    Pushing:      Pulling:    Other:    Reaching Above Shoulder (L):   Reaching Above Shoulder (R):       Reaching Below Shoulder (L):    Reaching Below Shoulder (R):      Not to exceed Weight Limits   Carrying(hrs):   Weight Limit(lb): < or = to 25 pounds Lifting(hrs):   Weight  Limit(lb):     Comments: POSITIVE tenderness at the CMC joint on the LEFT, splint helping some, but feels like it applies pressure in that area which can be uncomfortable, pain is intermittent, but has had significant improvement (90%) since her last visit.  She is still having pain with activity and she is having difficulty with pincer grasp/thumb opposition, therefore we are recommending formal occupational therapy    Repetitive Actions   Hands: i.e. Fine Manipulations from Grasping:     Feet: i.e. Operating Foot Controls:     Driving / Operate Machinery:     Physician Name: Home Marc M.D. Physician Signature: HOME Noble M.D. e-Signature: Dr. Antonio Smith, Medical Director   Clinic Name / Location: [address] [address] Clinic Phone Number: Dept: [phone]   Appointment Time: 8:45 Am Visit Start Time: 8:47 AM   Check-In Time:  8:40 Am Visit Discharge Time: 9:22 AM   Original-Treating Physician or Chiropractor    Page 2-Insurer/TPA    Page 3-Employer    Page 4-Employee

## 2019-02-28 NOTE — PROGRESS NOTES
"Subjective:      Davina Jones is a 48 y.o. female who presents with Work-Related Injury (WC F/V L wrist pain )      DOI 12/19/18 4th visit.  Work-related injury, Patient reports she was at work and picked up a case of [employer] energy drink (24 cans) and felt sharp wrist pain.    Pain is 90% improved.  Sharp pain is now GONE, occasional ache in the LEFT hand/wrist, volar wrist and base of thumb.  No more radiation of pain.  Wrist splint can feel uncomfortable.  She is still taking ibuprofen OTC. She has a prior history of Carpal tunnel syndrome BILATERALLY but well controlled and this feels different to her.  Currently still on restrictions     HPI    ROS       Objective:     /76 (BP Location: Left arm, Patient Position: Sitting, BP Cuff Size: Adult)   Pulse 96   Temp 36.6 °C (97.9 °F) (Temporal)   Resp 18   Ht 1.626 m (5' 4\")   Wt 81.6 kg (180 lb)   LMP 01/01/2011   SpO2 95%   BMI 30.90 kg/m²      Physical Exam    POSITIVE tenderness at the CMC joint on the LEFT compared to right, NO longer having tenderness along LEFT scaphoid or abductor pollicis, otherwise there is no TFCC insertion, distal radius or distal ulna.  Tinel's testing is NEGATIVE.  The hand is otherwise neurovascularly intact       Assessment/Plan:     1. Left wrist pain  REFERRAL TO OCCUPATIONAL THERAPY Reason for Therapy: Eval/Treat/Report    CANCELED: REFERRAL TO OCCUPATIONAL THERAPY Reason for Therapy: Eval/Treat/Report   2. CMC (carpometacarpal joint) sprains, left, subsequent encounter  REFERRAL TO OCCUPATIONAL THERAPY Reason for Therapy: Eval/Treat/Report    CANCELED: REFERRAL TO OCCUPATIONAL THERAPY Reason for Therapy: Eval/Treat/Report     POSITIVE tenderness at the CMC joint on the LEFT, splint helping some, but feels like it applies pressure in that area which can be uncomfortable, pain is intermittent, but has had significant improvement (90%) since her last visit.      She is still having pain with activity " and she is having difficulty with pincer grasp/thumb opposition, therefore we are recommending formal occupational therapy     Restriction, no lifting > 25 lbs    Return in about 3 weeks (around 3/21/2019).  To review restrictions and see if she has started formal occupational therapy

## 2019-03-27 ENCOUNTER — TELEPHONE (OUTPATIENT)
Dept: MEDICAL GROUP | Facility: CLINIC | Age: 49
End: 2019-03-27

## 2019-03-27 NOTE — TELEPHONE ENCOUNTER
03/27/19 @ 1640 I returned the patient's phone call. Appointment was scheduled for 03/28/19 @ 1230pm with Dr. Marc for WC visit. SAH  --------------------------------------------------------------------------  I called the patient at [phone] and left a message regarding her request & stated that the patient would need to be re-evaluated by Dr. Marc for a new referral to Physical Therapy and or needs to find a new local provider to take over her care since the patient lives out of town.  ----- Message from Nate Marc M.D. sent at 3/25/2019  6:33 PM PDT -----  Regarding: RE: New P/T Order Request  Contact: [phone]  Patient was due to follow up 3/21  Needs visit before considering re-referral  She can also talk to her  to have her case re-assigned closer to home.    Thanks  L    ----- Message -----  From: Jaycee Velarde, Med Ass't  Sent: 3/25/2019   3:38 PM  To: Nate Marc M.D.  Subject: New P/T Order Request                            A question for you.    The patient called on Friday evening, 03/22/19 to rescheduled a missed appointment for Sports Medicine and the patient recently moved to [place] and requested a new referral for physical therapy. If okay, can we send the patient to [facility]? I did call them and they do accept W/C insurance as long as the referral is approved. Once we get the P/T started, then we can make a follow up appt with Sports Medicine.      [facility]  Address: [address] [place] [address]  Phone: [phone]  Fax: [phone]    Jaycee

## 2019-03-28 ENCOUNTER — OCCUPATIONAL MEDICINE (OUTPATIENT)
Dept: MEDICAL GROUP | Facility: CLINIC | Age: 49
End: 2019-03-28
Payer: COMMERCIAL

## 2019-03-28 VITALS
OXYGEN SATURATION: 95 % | WEIGHT: 180 LBS | RESPIRATION RATE: 14 BRPM | SYSTOLIC BLOOD PRESSURE: 120 MMHG | TEMPERATURE: 98.3 F | HEART RATE: 82 BPM | BODY MASS INDEX: 30.73 KG/M2 | DIASTOLIC BLOOD PRESSURE: 80 MMHG | HEIGHT: 64 IN

## 2019-03-28 DIAGNOSIS — S63.8X2D CMC (CARPOMETACARPAL JOINT) SPRAINS, LEFT, SUBSEQUENT ENCOUNTER: ICD-10-CM

## 2019-03-28 PROCEDURE — 99213 OFFICE O/P EST LOW 20 MIN: CPT | Performed by: FAMILY MEDICINE

## 2019-03-28 NOTE — LETTER
[facility]  [address] [address]  Phone:  [phone] - Fax:  [phone]   [facility] Progress Report and Disability Certification  Date of Service: 3/28/2019   No Show:  No  Date / Time of Next Visit: 4/25/2019   Claim Information   Patient Name: Davina Jones  Claim Number:     Employer: [employer]  Date of Injury: 12/19/2018     Insurer / TPA: [employer]  ID / SSN:     Occupation: Associate  Diagnosis: The encounter diagnosis was CMC (carpometacarpal joint) sprains, left, subsequent encounter.    Medical Information   Related to Industrial Injury?      Subjective Complaints:  DOI 12/19/18 4th visit.  Work-related injury, Patient reports she was at work and picked up a case of [employer] energy drink (24 cans) and felt sharp wrist pain.    Pain is 90% improved.  She has not been contacted regarding occupational therapy, but recently found out it was approved.  Sharp pain is now GONE, occasional ache in the LEFT hand/wrist, intermittent, volar wrist and base of thumb.  Unknown aggravating factor.  No more radiation of pain.  She is still taking ibuprofen OTC DAILY. She has a prior history of Carpal tunnel syndrome BILATERALLY but well controlled and this feels different to her.  Currently she is on leave from work for unrelated/personal medical issue.   Objective Findings: POSITIVE tenderness at the CMC joint on the LEFT compared to right, NO having tenderness along LEFT scaphoid or abductor pollicis, otherwise there is no TFCC insertion, distal radius or distal ulna.  The hand is otherwise neurovascularly intact   Pre-Existing Condition(s):     Assessment:   Condition Same    Status: Additional Care Required  Permanent Disability:No    Plan:      Diagnostics:      Comments:       Disability Information   Status: Released to Restricted Duty    From:  3/28/2019  Through: 4/25/2019 Restrictions are: Temporary      Physical Restrictions   Sitting:    Standing:    Stooping:    Bending:      Squatting:    Walking:    Climbing:    Pushing:      Pulling:    Other:    Reaching Above Shoulder (L):   Reaching Above Shoulder (R):       Reaching Below Shoulder (L):    Reaching Below Shoulder (R):      Not to exceed Weight Limits   Carrying(hrs):   Weight Limit(lb):   Lifting(hrs):   Weight  Limit(lb):     Comments: POSITIVE tenderness at the CMC joint on the LEFT, improvement (90%) since initial visit, but about the same as last visit.  She is still having pain with activity and she is having difficulty with pincer grasp/thumb opposition, formal occupational therapy was approved, but patient did NOT know.  She has since moved and is asking to have OT done in [place].  NEW OT order placed for approval out there.    Repetitive Actions   Hands: i.e. Fine Manipulations from Grasping:     Feet: i.e. Operating Foot Controls:     Driving / Operate Machinery:     Physician Name: Home Macr M.D. Physician Signature: HOME Noble M.D. e-Signature: Dr. King San, Medical Director   Clinic Name / Location: [address] [address] Clinic Phone Number: Dept: [phone]   Appointment Time: 12:30 Pm Visit Start Time: 12:36 PM   Check-In Time:  12:17 Pm Visit Discharge Time: 1:03 Pm    Original-Treating Physician or Chiropractor    Page 2-Insurer/TPA    Page 3-Employer    Page 4-Employee

## 2019-03-28 NOTE — PROGRESS NOTES
"Subjective:      Davina Jones is a 48 y.o. female who presents with Wrist Injury ( Work-Related Injury (WC F/V L wrist pain ), DOI 12/19/18 .)      DOI 12/19/18 4th visit.  Work-related injury, Patient reports she was at work and picked up a case of [employer] energy drink (24 cans) and felt sharp wrist pain.    Pain is 90% improved.  She has not been contacted regarding occupational therapy, but recently found out it was approved.  Sharp pain is now GONE, occasional ache in the LEFT hand/wrist, intermittent, volar wrist and base of thumb.  Unknown aggravating factor.  No more radiation of pain.  She is still taking ibuprofen OTC DAILY. She has a prior history of Carpal tunnel syndrome BILATERALLY but well controlled and this feels different to her.  Currently she is on leave from work for unrelated/personal medical issue.     HPI    ROS       Objective:     /80 (BP Location: Left arm, Patient Position: Sitting, BP Cuff Size: Adult)   Pulse 82   Temp 36.8 °C (98.3 °F) (Temporal)   Resp 14   Ht 1.626 m (5' 4\")   Wt 81.6 kg (180 lb)   LMP 01/01/2011   SpO2 95%   BMI 30.90 kg/m²      Physical Exam    POSITIVE tenderness at the CMC joint on the LEFT compared to right, NO having tenderness along LEFT scaphoid or abductor pollicis, otherwise there is no TFCC insertion, distal radius or distal ulna.  The hand is otherwise neurovascularly intact       Assessment/Plan:     1. CMC (carpometacarpal joint) sprains, left, subsequent encounter  REFERRAL TO OCCUPATIONAL THERAPY Reason for Therapy: Eval/Treat/Report     POSITIVE tenderness at the CMC joint on the LEFT, improvement (90%) since initial visit, but about the same as last visit.      She is still having pain with activity and she is having difficulty with pincer grasp/thumb opposition, formal occupational therapy was approved, but patient did NOT know.  She has since moved and is asking to have OT done in [place].    NEW OT order placed for " approval out there.    Return in about 4 weeks (around 4/25/2019).   To see how she is doing with occupational therapy out in [place]

## 2019-04-26 ENCOUNTER — OCCUPATIONAL MEDICINE (OUTPATIENT)
Dept: MEDICAL GROUP | Facility: CLINIC | Age: 49
End: 2019-04-26
Payer: COMMERCIAL

## 2019-04-26 VITALS
SYSTOLIC BLOOD PRESSURE: 118 MMHG | WEIGHT: 180 LBS | RESPIRATION RATE: 18 BRPM | BODY MASS INDEX: 30.73 KG/M2 | HEART RATE: 82 BPM | OXYGEN SATURATION: 97 % | HEIGHT: 64 IN | DIASTOLIC BLOOD PRESSURE: 80 MMHG | TEMPERATURE: 97.9 F

## 2019-04-26 DIAGNOSIS — S63.8X2D CMC (CARPOMETACARPAL JOINT) SPRAINS, LEFT, SUBSEQUENT ENCOUNTER: ICD-10-CM

## 2019-04-26 DIAGNOSIS — M25.532 LEFT WRIST PAIN: ICD-10-CM

## 2019-04-26 PROCEDURE — 99213 OFFICE O/P EST LOW 20 MIN: CPT | Performed by: FAMILY MEDICINE

## 2019-04-26 NOTE — PROGRESS NOTES
"Subjective:      Davina Jones is a 48 y.o. female who presents with Work-Related Injury (WC F/V )      DOI 12/19/18 4th visit.  Work-related injury, Patient reports she was at work and picked up a case of [employer] energy drink (24 cans) and felt sharp wrist pain.    Pain is 80% improved.  She has started occupational therapy in [facility].  Sharp pain is GONE, more of a dull pain. occasional CONTINUED ache in the LEFT hand/wrist, intermittent, volar wrist and base of thumb, worse with long drives.  No radiation. She is still taking ibuprofen OTC DAILY.     HPI    ROS       Objective:     /80 (BP Location: Right arm, Patient Position: Sitting, BP Cuff Size: Adult)   Pulse 82   Temp 36.6 °C (97.9 °F) (Temporal)   Resp 18   Ht 1.626 m (5' 4\")   Wt 81.6 kg (180 lb)   LMP 01/01/2011   SpO2 97%   BMI 30.90 kg/m²      Physical Exam    POSITIVE tenderness at the CMC joint on the LEFT compared to right, NO having tenderness along LEFT scaphoid or abductor pollicis, otherwise there is no TFCC insertion, distal radius or distal ulna.  The hand is otherwise neurovascularly intact. NO lateral epicondyle tenderness       Assessment/Plan:     1. CMC (carpometacarpal joint) sprains, left, subsequent encounter  REFERRAL TO SPORTS MEDICINE   2. Left wrist pain       CONTINUED tenderness at the CMC joint on the LEFT, improvement (80%), worse since last visit likely due to being pushed harder by her occupational therapist which is expected.      She is still having pain with activity and she is having difficulty with pincer grasp/thumb opposition.   She has started OT done in [place].      PRIOR AUTH for RIGHT CMC injection under u/s guidance if pain persists.    Return in about 6 weeks (around 6/7/2019).  Or sooner if CMC injection is approved  "

## 2019-04-26 NOTE — LETTER
[facility]– [place]  [address] [address]  Phone:  [phone] - Fax:  [phone]   [facility] Progress Report and Disability Certification  Date of Service: 4/26/2019   No Show:  No  Date / Time of Next Visit: 6/7/2019   Claim Information   Patient Name: Davina Jones  Claim Number:     Employer: [employer]  Date of Injury: 12/19/2018     Insurer / TPA: [employer] ID / SSN:     Occupation: Associate  Diagnosis: Diagnoses of CMC (carpometacarpal joint) sprains, left, subsequent encounter and Left wrist pain were pertinent to this visit.    Medical Information   Related to Industrial Injury? No    Subjective Complaints:  DOI 12/19/18 4th visit.  Work-related injury, Patient reports she was at work and picked up a case of [employer] energy drink (24 cans) and felt sharp wrist pain.    Pain is 80% improved.  She has started occupational therapy in [facility].  Sharp pain is GONE, more of a dull pain. occasional CONTINUED ache in the LEFT hand/wrist, intermittent, volar wrist and base of thumb, worse with long drives.  No radiation. She is still taking ibuprofen OTC DAILY.   Objective Findings: POSITIVE tenderness at the CMC joint on the LEFT compared to right, NO having tenderness along LEFT scaphoid or abductor pollicis, otherwise there is no TFCC insertion, distal radius or distal ulna.  The hand is otherwise neurovascularly intact. NO lateral epicondyle tenderness   Pre-Existing Condition(s): Carpal tunnel syndrome BILATERALLY but well controlled and this feels different to her.     Assessment:   Condition Worsened  Comments:ole due to being pushed harder by her new OT    Status: Additional Care Required  Permanent Disability:No    Plan:      Diagnostics:      Comments:       Disability Information   Status: Released to Restricted Duty    From:  4/26/2019  Through: 6/7/2019 Restrictions are: Temporary   Physical  Restrictions   Sitting:    Standing:    Stooping:    Bending:      Squatting:    Walking:    Climbing:    Pushing:      Pulling:    Other:    Reaching Above Shoulder (L):   Reaching Above Shoulder (R):       Reaching Below Shoulder (L):    Reaching Below Shoulder (R):      Not to exceed Weight Limits   Carrying(hrs):   Weight Limit(lb):   Lifting(hrs):   Weight  Limit(lb):     Comments: CONTINUED tenderness at the CMC joint on the LEFT, improvement (80%), worse since last visit likely due to being pushed harder by her occupational therapist which is expected.  She is still having pain with activity and she is having difficulty with pincer grasp/thumb opposition.   She has started OT done in [place].  PRIOR AUTH for RIGHT CMC injection under u/s guidance if pain persists.    Repetitive Actions   Hands: i.e. Fine Manipulations from Grasping:     Feet: i.e. Operating Foot Controls:     Driving / Operate Machinery:     Physician Name: Nate Marc M.D. Physician Signature:   e-Signature: Dr. King San, Medical Director   Clinic Name / Location: [address] [address] Clinic Phone Number: Dept: [phone]   Appointment Time: 12:45 Pm Visit Start Time: 12:43 PM   Check-In Time:  11:49 Am Visit Discharge Time: 1:18 PM   Original-Treating Physician or Chiropractor    Page 2-Insurer/TPA    Page 3-Employer    Page 4-Employee

## 2019-05-30 ENCOUNTER — OCCUPATIONAL MEDICINE (OUTPATIENT)
Dept: MEDICAL GROUP | Facility: CLINIC | Age: 49
End: 2019-05-30
Payer: COMMERCIAL

## 2019-05-30 VITALS — HEIGHT: 64 IN | WEIGHT: 180 LBS | BODY MASS INDEX: 30.73 KG/M2

## 2019-05-30 DIAGNOSIS — S63.8X2D CMC (CARPOMETACARPAL JOINT) SPRAINS, LEFT, SUBSEQUENT ENCOUNTER: ICD-10-CM

## 2019-05-30 PROCEDURE — 20606 DRAIN/INJ JOINT/BURSA W/US: CPT | Performed by: FAMILY MEDICINE

## 2019-05-30 RX ORDER — TRIAMCINOLONE ACETONIDE 40 MG/ML
20 INJECTION, SUSPENSION INTRA-ARTICULAR; INTRAMUSCULAR ONCE
Status: COMPLETED | OUTPATIENT
Start: 2019-05-30 | End: 2019-05-30

## 2019-05-30 RX ADMIN — TRIAMCINOLONE ACETONIDE 20 MG: 40 INJECTION, SUSPENSION INTRA-ARTICULAR; INTRAMUSCULAR at 12:32

## 2019-05-30 NOTE — LETTER
[facility]  [address] [address]  Phone:  [phone] - Fax:  [phone]   Occupational Health Network Progress Report and Disability Certification  Date of Service: 5/30/2019   No Show:  No  Date / Time of Next Visit: 6/28/2019@11am   Claim Information   Patient Name: Davina Jones  Claim Number: N/A    Employer: [employer] Date of Injury: 12/19/2018     Insurer / TPA: [employer] ID / SSN:     Occupation: Associate  Diagnosis: The encounter diagnosis was CMC (carpometacarpal joint) sprains, left, subsequent encounter.    Medical Information   Related to Industrial Injury? No    Subjective Complaints:  DOI 12/19/18 4th visit.  Work-related injury, Patient reports she was at work and picked up a case of [employer] energy drink (24 cans) and felt sharp wrist pain.    Pain persists.  She has started occupational therapy in [facility], she will need approval for more sessions.  Continued dull pain in the LEFT hand/wrist, intermittent, volar wrist and base of thumb, worse with long drives.     Objective Findings: POSITIVE tenderness at the CMC joint on the LEFT The hand is otherwise neurovascularly intact. NO lateral epicondyle tenderness   Pre-Existing Condition(s): Carpal tunnel syndrome BILATERALLY but well controlled and this feels different to her.     Assessment:   Condition Same    Status:    Permanent Disability:No    Plan: OT  Comments:CMC corticosteroid injection performed May 30, 2019    Diagnostics:      Comments:  Procedure note: Risks and benefits discussed, informed consent obtained, LEFT wrist prepped with aseptic technique, and indirect needle visualization utilized to inject LEFT CMC joint with 20 mg of Kenalog and half cc of bupivacaine.  Patient tolerat  ed the procedure well and postprocedure red flags care discussed.    Disability Information   Status: Released to Restricted Duty    From:  5/30/2019  Through:  6/27/2019 Restrictions are: Temporary   Physical Restrictions   Sitting:    Standing:    Stooping:    Bending:      Squatting:    Walking:    Climbing:    Pushing:      Pulling:    Other:    Reaching Above Shoulder (L):   Reaching Above Shoulder (R):       Reaching Below Shoulder (L):    Reaching Below Shoulder (R):      Not to exceed Weight Limits   Carrying(hrs):   Weight Limit(lb): < or = to 25 pounds Lifting(hrs):   Weight  Limit(lb):     Comments: CONTINUED tenderness at the CMC joint on the LEFT, still working with occupational therapist and needs renewal for therapy visits. She is still having pain with activity and she is having difficulty with pincer grasp/thumb opposition.   RIGHT CMC injection under u/s guidance PERFORMED in the office TODAY (May 30, 2019)    Repetitive Actions   Hands: i.e. Fine Manipulations from Grasping:     Feet: i.e. Operating Foot Controls:     Driving / Operate Machinery:     Physician Name: Home Marc M.D. Physician Signature: HOME Noble M.D. e-Signature: Dr. King San, Medical Director   Clinic Name / Location: [address] [address] Clinic Phone Number: Dept: [phone]   Appointment Time: 10:45 Am Visit Start Time: 10:48 AM   Check-In Time:  10:12 Am Visit Discharge Time: 11:30 AM   Original-Treating Physician or Chiropractor    Page 2-Insurer/TPA    Page 3-Employer    Page 4-Employee

## 2019-05-30 NOTE — LETTER
[facility]  [address] [address]  Phone:  [phone] - Fax:  [phone]   Occupational Health Network Progress Report and Disability Certification  Date of Service: 5/30/2019   No Show:  No  Date / Time of Next Visit: 6/28/2019@11am   Claim Information   Patient Name: Davina Jones  Claim Number: N/A    Employer: [employer] Date of Injury: 12/19/2018     Insurer / TPA: [employer] ID / SSN:     Occupation: Associate  Diagnosis: The encounter diagnosis was CMC (carpometacarpal joint) sprains, left, subsequent encounter.    Medical Information   Related to Industrial Injury? YES   Subjective Complaints:  DOI 12/19/18 4th visit.  Work-related injury, Patient reports she was at work and picked up a case of [employer] energy drink (24 cans) and felt sharp wrist pain.    Pain persists.  She has started occupational therapy in [facility], she will need approval for more sessions.  Continued dull pain in the LEFT hand/wrist, intermittent, volar wrist and base of thumb, worse with long drives.     Objective Findings: POSITIVE tenderness at the CMC joint on the LEFT The hand is otherwise neurovascularly intact. NO lateral epicondyle tenderness   Pre-Existing Condition(s): Carpal tunnel syndrome BILATERALLY but well controlled and this feels different to her.     Assessment:   Condition Same    Status:    Permanent Disability:No    Plan: OT  Comments:CMC corticosteroid injection performed May 30, 2019    Diagnostics:      Comments:  Procedure note: Risks and benefits discussed, informed consent obtained, LEFT wrist prepped with aseptic technique, and indirect needle visualization utilized to inject LEFT CMC joint with 20 mg of Kenalog and half cc of bupivacaine.  Patient tolerat  ed the procedure well and postprocedure red flags care discussed.    Disability Information   Status: Released to Restricted Duty    From:  5/30/2019  Through:  6/27/2019 Restrictions are: Temporary   Physical Restrictions   Sitting:    Standing:    Stooping:    Bending:      Squatting:    Walking:    Climbing:    Pushing:      Pulling:    Other:    Reaching Above Shoulder (L):   Reaching Above Shoulder (R):       Reaching Below Shoulder (L):    Reaching Below Shoulder (R):      Not to exceed Weight Limits   Carrying(hrs):   Weight Limit(lb): < or = to 25 pounds Lifting(hrs):   Weight  Limit(lb):     Comments: CONTINUED tenderness at the CMC joint on the LEFT, still working with occupational therapist and needs renewal for therapy visits. She is still having pain with activity and she is having difficulty with pincer grasp/thumb opposition.   RIGHT CMC injection under u/s guidance PERFORMED in the office TODAY (May 30, 2019)    Repetitive Actions   Hands: i.e. Fine Manipulations from Grasping:     Feet: i.e. Operating Foot Controls:     Driving / Operate Machinery:     Physician Name: Home Marc M.D. Physician Signature: HOME Noble M.D. e-Signature: Dr. King San, Medical Director   Clinic Name / Location: [address] [address] Clinic Phone Number: Dept: [phone]   Appointment Time: 10:45 Am Visit Start Time: 10:48 AM   Check-In Time:  10:12 Am Visit Discharge Time: 11:30 AM   Original-Treating Physician or Chiropractor    Page 2-Insurer/TPA    Page 3-Employer    Page 4-Employee

## 2019-05-30 NOTE — PROGRESS NOTES
"Subjective:      Davina Jones is a 48 y.o. female who presents with Work-Related Injury (WC F/V )      DOI 12/19/18 4th visit.  Work-related injury, Patient reports she was at work and picked up a case of [employer] energy drink (24 cans) and felt sharp wrist pain.    Pain persists.  She has started occupational therapy in [facility], she will need approval for more sessions.  Continued dull pain in the LEFT hand/wrist, intermittent, volar wrist and base of thumb, worse with long drives.       HPI    ROS       Objective:     Ht 1.626 m (5' 4\")   Wt 81.6 kg (180 lb)   LMP 01/01/2011   BMI 30.90 kg/m²      Physical Exam    POSITIVE tenderness at the CMC joint on the LEFT The hand is otherwise neurovascularly intact. NO lateral epicondyle tenderness       Assessment/Plan:     1. CMC (carpometacarpal joint) sprains, left, subsequent encounter  REFERRAL TO OCCUPATIONAL THERAPY Reason for Therapy: Eval/Treat/Report    triamcinolone acetonide (KENALOG-40) injection 20 mg     CONTINUED tenderness at the CMC joint on the LEFT, still working with occupational therapist and needs renewal for therapy visits. She is still having pain with activity and she is having difficulty with pincer grasp/thumb opposition.     RIGHT CMC injection under u/s guidance PERFORMED in the office TODAY (May 30, 2019)     Recommend continued occupational therapy    Return in about 4 weeks (around 6/27/2019).  To see how she is doing after LEFT CMC corticosteroid injection under ultrasound guidance  If symptoms persist, consider MRI study  "

## 2019-06-27 ENCOUNTER — OCCUPATIONAL MEDICINE (OUTPATIENT)
Dept: MEDICAL GROUP | Facility: CLINIC | Age: 49
End: 2019-06-27
Payer: COMMERCIAL

## 2019-06-27 VITALS
HEIGHT: 64 IN | DIASTOLIC BLOOD PRESSURE: 76 MMHG | RESPIRATION RATE: 16 BRPM | TEMPERATURE: 97.8 F | WEIGHT: 180 LBS | BODY MASS INDEX: 30.73 KG/M2 | HEART RATE: 90 BPM | OXYGEN SATURATION: 99 % | SYSTOLIC BLOOD PRESSURE: 118 MMHG

## 2019-06-27 DIAGNOSIS — S63.8X2D CMC (CARPOMETACARPAL JOINT) SPRAINS, LEFT, SUBSEQUENT ENCOUNTER: ICD-10-CM

## 2019-06-27 DIAGNOSIS — M25.532 LEFT WRIST PAIN: ICD-10-CM

## 2019-06-27 PROCEDURE — 99213 OFFICE O/P EST LOW 20 MIN: CPT | Performed by: FAMILY MEDICINE

## 2019-06-27 NOTE — LETTER
[facility]  [address] [address]  Phone:  [phone] - Fax:  [phone]   Occupational Health Network Progress Report and Disability Certification  Date of Service: 6/27/2019   No Show:  No  Date / Time of Next Visit: 7/25/2019   Claim Information   Patient Name: Davina Jones  Claim Number:     Employer: [employer]  Date of Injury: 12/19/2018     Insurer / TPA: [employer]  ID / SSN:     Occupation: Associate  Diagnosis: Diagnoses of CMC (carpometacarpal joint) sprains, left, subsequent encounter and Left wrist pain were pertinent to this visit.    Medical Information   Related to Industrial Injury? No    Subjective Complaints:   DOI 12/19/18 4th visit.  Work-related injury, Patient reports she was at work and picked up a case of [employer] energy drink (24 cans) and felt sharp wrist pain.    Pain persists.  Noticed improvement for 3 days after the injection.  She then went fishing and noticed pain with simply holding the pole. She continued occupational therapy in [facility], which has helped, but she has some post-session stiffness.  Continued dull pain in the LEFT hand/wrist, intermittent, worse with pincer grasping and at night. Certain movements continue to set it off. She has added larger  to her home gardening tools which has helped some, but pain persists   Objective Findings: POSITIVE tenderness at the CMC joint on the LEFT The hand is otherwise neurovascularly intact. NO lateral epicondyle tenderness   Pre-Existing Condition(s): MRI   Assessment:   Condition Same    Status: Additional Care Required  Permanent Disability:No    Plan:      Diagnostics:      Comments:       Disability Information   Status: Released to Restricted Duty    From:  6/27/2019  Through: 7/25/2019 Restrictions are: Temporary   Physical Restrictions   Sitting:    Standing:    Stooping:    Bending:      Squatting:    Walking:    Climbing:   Pushing:      Pulling:    Other:    Reaching Above Shoulder (L):   Reaching Above Shoulder (R):       Reaching Below Shoulder (L):    Reaching Below Shoulder (R):      Not to exceed Weight Limits   Carrying(hrs):   Weight Limit(lb): < or = to 25 pounds Lifting(hrs):   Weight  Limit(lb):     Comments:      Repetitive Actions   Hands: i.e. Fine Manipulations from Grasping:     Feet: i.e. Operating Foot Controls:     Driving / Operate Machinery:     Physician Name: Home Marc M.D. Physician Signature: HOME Noble M.D. e-Signature: Dr. King San, Medical Director   Clinic Name / Location: [address] [address] Clinic Phone Number: Dept: [phone]   Appointment Time: 4:15 Pm Visit Start Time: 4:06 PM   Check-In Time:  4:00 Pm Visit Discharge Time:  04:53 pm    Original-Treating Physician or Chiropractor    Page 2-Insurer/TPA    Page 3-Employer    Page 4-Employee

## 2019-06-27 NOTE — LETTER
[facility]  [address] [address]  Phone:  [phone] - Fax:  [phone]   Occupational Health Network Progress Report and Disability Certification  Date of Service: 6/27/2019   No Show:  No  Date / Time of Next Visit: 7/25/2019   Claim Information   Patient Name: Davina Jones  Claim Number:     Employer: [employer]  Date of Injury: 12/19/2018     Insurer / TPA: [employer]  ID / SSN:     Occupation: Associate  Diagnosis: Diagnoses of CMC (carpometacarpal joint) sprains, left, subsequent encounter and Left wrist pain were pertinent to this visit.    Medical Information   Related to Industrial Injury? No    Subjective Complaints:   DOI 12/19/18 4th visit.  Work-related injury, Patient reports she was at work and picked up a case of [employer] energy drink (24 cans) and felt sharp wrist pain.    Pain persists.  Noticed improvement for 3 days after the injection.  She then went fishing and noticed pain with simply holding the pole. She continued occupational therapy in [facility], which has helped, but she has some post-session stiffness.  Continued dull pain in the LEFT hand/wrist, intermittent, worse with pincer grasping and at night. Certain movements continue to set it off. She has added larger  to her home gardening tools which has helped some, but pain persists   Objective Findings: POSITIVE tenderness at the CMC joint on the LEFT The hand is otherwise neurovascularly intact. NO lateral epicondyle tenderness   Pre-Existing Condition(s): MRI   Assessment:   Condition Same    Status: Additional Care Required  Permanent Disability:No    Plan:      Diagnostics:      Comments:       Disability Information   Status: Released to Restricted Duty    From:  6/27/2019  Through: 7/25/2019 Restrictions are: Temporary   Physical Restrictions   Sitting:    Standing:    Stooping:    Bending:      Squatting:    Walking:    Climbing:   Pushing:      Pulling:    Other:    Reaching Above Shoulder (L):   Reaching Above Shoulder (R):       Reaching Below Shoulder (L):    Reaching Below Shoulder (R):      Not to exceed Weight Limits   Carrying(hrs):   Weight Limit(lb): < or = to 25 pounds Lifting(hrs):   Weight  Limit(lb):     Comments: CONTINUED tenderness at the CMC joint on the LEFT, she has been working with occupational therapist extensively.  It has helped, but her symptoms persist.  She is still having pain with activity and she is having difficulty with pincer grasp/thumb opposition. RIGHT CMC injection PERFORMED (May 30, 2019) helped temporarily, but now she is back to having the same amount of pain and difficulty with hand function.  At this point, recommend MRI study for evaluation of the CMC joint.    Repetitive Actions   Hands: i.e. Fine Manipulations from Grasping:     Feet: i.e. Operating Foot Controls:     Driving / Operate Machinery:     Physician Name: Home Marc M.D. Physician Signature: HOME Noble M.D. e-Signature: Dr. King San, Medical Director   Clinic Name / Location: [address] [address] Clinic Phone Number: Dept: [phone]   Appointment Time: 4:15 Pm Visit Start Time: 4:06 PM   Check-In Time:  4:00 Pm Visit Discharge Time: 4:55 Pm    Original-Treating Physician or Chiropractor    Page 2-Insurer/TPA    Page 3-Employer    Page 4-Employee

## 2019-06-28 NOTE — PROGRESS NOTES
"Subjective:      Davina Jones is a 48 y.o. female who presents with Work-Related Injury (WC F/V L wrist pain )       DOI 12/19/18 4th visit.  Work-related injury, Patient reports she was at work and picked up a case of [employer] energy drink (24 cans) and felt sharp wrist pain.    Pain persists.  Noticed improvement for 3 days after the injection.  She then went fishing and noticed pain with simply holding the pole. She continued occupational therapy in [facility], which has helped, but she has some post-session stiffness.  Continued dull pain in the LEFT hand/wrist, intermittent, worse with pincer grasping and at night. Certain movements continue to set it off. She has added larger  to her home gardening tools which has helped some, but pain persists     HPI    ROS       Objective:     /76 (BP Location: Right arm, Patient Position: Sitting, BP Cuff Size: Adult)   Pulse 90   Temp 36.6 °C (97.8 °F) (Temporal)   Resp 16   Ht 1.626 m (5' 4\")   Wt 81.6 kg (180 lb)   LMP 01/01/2011   SpO2 99%   BMI 30.90 kg/m²      Physical Exam    POSITIVE tenderness at the CMC joint on the LEFT The hand is otherwise neurovascularly intact. NO lateral epicondyle tenderness       Assessment/Plan:     1. CMC (carpometacarpal joint) sprains, left, subsequent encounter  MR-WRIST W/O LEFT   2. Left wrist pain       CONTINUED tenderness at the CMC joint on the LEFT, she has been working with occupational therapist extensively.  It has helped, but her symptoms persist.  She is still having pain with activity and she is having difficulty with pincer grasp/thumb opposition. RIGHT CMC injection PERFORMED (May 30, 2019) helped temporarily, but now she is back to having the same amount of pain and difficulty with hand function.  At this point, recommend MRI study for evaluation of the CMC joint.     Return in about 4 weeks (around 7/25/2019).    "

## 2019-07-24 ENCOUNTER — HOSPITAL ENCOUNTER (OUTPATIENT)
Dept: RADIOLOGY | Facility: MEDICAL CENTER | Age: 49
End: 2019-07-24

## 2019-08-01 ENCOUNTER — OCCUPATIONAL MEDICINE (OUTPATIENT)
Dept: MEDICAL GROUP | Facility: CLINIC | Age: 49
End: 2019-08-01
Payer: COMMERCIAL

## 2019-08-01 VITALS
DIASTOLIC BLOOD PRESSURE: 76 MMHG | HEIGHT: 64 IN | RESPIRATION RATE: 16 BRPM | TEMPERATURE: 97.9 F | WEIGHT: 180 LBS | SYSTOLIC BLOOD PRESSURE: 118 MMHG | HEART RATE: 78 BPM | BODY MASS INDEX: 30.73 KG/M2 | OXYGEN SATURATION: 97 %

## 2019-08-01 DIAGNOSIS — S63.8X2D CMC (CARPOMETACARPAL JOINT) SPRAINS, LEFT, SUBSEQUENT ENCOUNTER: ICD-10-CM

## 2019-08-01 DIAGNOSIS — M85.442: ICD-10-CM

## 2019-08-01 PROCEDURE — 99213 OFFICE O/P EST LOW 20 MIN: CPT | Performed by: FAMILY MEDICINE

## 2019-08-01 NOTE — LETTER
[facility] [place]  [address] [address]  Phone:  [phone] - Fax:  [phone]   [facility] Progress Report and Disability Certification  Date of Service: 8/1/2019   No Show:  No  Date / Time of Next Visit: 9/2/2019   Claim Information   Patient Name: Davina Jones  Claim Number:     Employer: [employer]  Date of Injury: 12/19/2018     Insurer / TPA: [employer]  ID / SSN:     Occupation: Associate  Diagnosis: Diagnoses of CMC (carpometacarpal joint) sprains, left, subsequent encounter and Solitary bone cyst, left hand were pertinent to this visit.    Medical Information   Related to Industrial Injury? 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  Unclear whether cyst was present prior to the injury or if the cyst was a result of the injury.    Subjective Complaints:    DOI 12/19/18 4th visit.  Work-related injury, Patient reports she was at work and picked up a case of [employer] energy drink (24 cans) and felt sharp wrist pain.    Pain persists.  She CANNOT fish or . She has FAILED therapy.  Certain movements continue to set it off. She has added larger  to her home gardening tools which has helped some, but pain persists   Objective Findings: POSITIVE tenderness at the triquetrum of the LEFT wrist and at the CMC joint region. Pain with manipulation of the 2nd metacarpal triquetral joint. NO lateral epicondyle tenderness   Pre-Existing Condition(s):     Assessment:   Condition Same    Status: Additional Care Required  Permanent Disability:No    Plan:      Diagnostics:      Comments:       Disability Information   Status: Released to Restricted Duty    From:  8/1/2019  Through: 9/2/2019 Restrictions are: Temporary   Physical Restrictions   Sitting:    Standing:    Stooping:    Bending:      Squatting:    Walking:    Climbing:    Pushing:         Pulling:    Other:    Reaching Above Shoulder (L):   Reaching Above Shoulder (R):       Reaching Below Shoulder (L):    Reaching Below Shoulder (R):      Not to exceed Weight Limits   Carrying(hrs):   Weight Limit(lb): < or = to 25 pounds Lifting(hrs):   Weight  Limit(lb):     Comments: CONTINUED tenderness at the CMC joint on the LEFT, MRI reveals triquetral cyst with tenderness in this region which may explain why she was having CMC tenderness as well.  She FAILED occupational therapy.  She is still having pain with activity and she is having difficulty with pincer grasp/thumb opposition.  She FAILED RIGHT CMC injection PERFORMED (May 30, 2019).  At this point, recommend he had surgery evaluation to see if there are any other treatment options for her triquetral cyst which appears to be the source of her pain.    Repetitive Actions   Hands: i.e. Fine Manipulations from Grasping:     Feet: i.e. Operating Foot Controls:     Driving / Operate Machinery:     Physician Name: Home Marc M.D. Physician Signature: HOME Noble M.D. e-Signature: Dr. King San, Medical Director   Clinic Name / Location: [address] [address] Clinic Phone Number: Dept: [phone]   Appointment Time: 12:45 Pm Visit Start Time: 12:33 PM   Check-In Time:  12:20 Pm Visit Discharge Time:  01:37 pm    Original-Treating Physician or Chiropractor    Page 2-Insurer/TPA    Page 3-Employer    Page 4-Employee

## 2019-08-02 NOTE — PROGRESS NOTES
"Subjective:      Davina Jones is a 48 y.o. female who presents with Work-Related Injury (WC F/V )        DOI 12/19/18 4th visit.  Work-related injury, Patient reports she was at work and picked up a case of [employer] energy drink (24 cans) and felt sharp wrist pain.    Pain persists.  She CANNOT fish or . She has FAILED therapy.  Certain movements continue to set it off. She has added larger  to her home gardening tools which has helped some, but pain persists     HPI    ROS       Objective:     /76 (BP Location: Right arm, Patient Position: Sitting, BP Cuff Size: Large adult)   Pulse 78   Temp 36.6 °C (97.9 °F) (Temporal)   Resp 16   Ht 1.626 m (5' 4\")   Wt 81.6 kg (180 lb)   LMP 01/01/2011   SpO2 97%   BMI 30.90 kg/m²      Physical Exam    POSITIVE tenderness at the triquetrum of the LEFT wrist and at the CMC joint region. Pain with manipulation of the 2nd metacarpal triquetral joint. NO lateral epicondyle tenderness       Assessment/Plan:     1. CMC (carpometacarpal joint) sprains, left, subsequent encounter  REFERRAL TO ORTHOPEDICS   2. Solitary bone cyst, left hand  REFERRAL TO ORTHOPEDICS     CONTINUED tenderness at the CMC joint on the LEFT, MRI reveals triquetral cyst with tenderness in this region which may explain why she was having CMC tenderness as well    She FAILED occupational therapy  She is still having pain with activity and she is having difficulty with pincer grasp/thumb opposition  She FAILED RIGHT CMC injection PERFORMED (May 30, 2019)    At this point, recommend he had surgery evaluation to see if there are any other treatment options for her triquetral cyst which appears to be the source of her pain    Return in about 4 weeks (around 8/29/2019).    Continue same limitations of not lifting greater than 25 pounds  "

## 2019-09-24 ENCOUNTER — OFFICE VISIT (OUTPATIENT)
Dept: MEDICAL GROUP | Facility: PHYSICIAN GROUP | Age: 49
End: 2019-09-24
Payer: COMMERCIAL

## 2019-09-24 VITALS
HEART RATE: 108 BPM | TEMPERATURE: 98.7 F | OXYGEN SATURATION: 94 % | RESPIRATION RATE: 14 BRPM | BODY MASS INDEX: 33.7 KG/M2 | WEIGHT: 197.4 LBS | DIASTOLIC BLOOD PRESSURE: 90 MMHG | SYSTOLIC BLOOD PRESSURE: 122 MMHG | HEIGHT: 64 IN

## 2019-09-24 DIAGNOSIS — R06.02 SHORTNESS OF BREATH: ICD-10-CM

## 2019-09-24 DIAGNOSIS — Z12.39 BREAST CANCER SCREENING: ICD-10-CM

## 2019-09-24 DIAGNOSIS — Z71.6 ENCOUNTER FOR SMOKING CESSATION COUNSELING: ICD-10-CM

## 2019-09-24 DIAGNOSIS — E78.01 FAMILIAL HYPERCHOLESTEROLEMIA: ICD-10-CM

## 2019-09-24 DIAGNOSIS — R53.83 FATIGUE, UNSPECIFIED TYPE: ICD-10-CM

## 2019-09-24 DIAGNOSIS — E55.9 VITAMIN D DEFICIENCY: ICD-10-CM

## 2019-09-24 DIAGNOSIS — E66.9 OBESITY (BMI 30-39.9): ICD-10-CM

## 2019-09-24 DIAGNOSIS — R63.5 WEIGHT GAIN: ICD-10-CM

## 2019-09-24 DIAGNOSIS — Z23 NEED FOR VACCINATION: ICD-10-CM

## 2019-09-24 DIAGNOSIS — E78.5 DYSLIPIDEMIA: ICD-10-CM

## 2019-09-24 PROCEDURE — 90471 IMMUNIZATION ADMIN: CPT | Performed by: FAMILY MEDICINE

## 2019-09-24 PROCEDURE — 90686 IIV4 VACC NO PRSV 0.5 ML IM: CPT | Performed by: FAMILY MEDICINE

## 2019-09-24 PROCEDURE — 99214 OFFICE O/P EST MOD 30 MIN: CPT | Mod: 25 | Performed by: FAMILY MEDICINE

## 2019-09-24 RX ORDER — BUPROPION HYDROCHLORIDE 150 MG/1
150 TABLET, FILM COATED, EXTENDED RELEASE ORAL 2 TIMES DAILY
Qty: 60 TAB | Refills: 11 | Status: SHIPPED | OUTPATIENT
Start: 2019-09-24 | End: 2022-10-17

## 2019-09-24 RX ORDER — TOLTERODINE 4 MG/1
4 CAPSULE, EXTENDED RELEASE ORAL
Refills: 3 | COMMUNITY
Start: 2019-07-15 | End: 2019-09-24

## 2019-09-24 ASSESSMENT — PATIENT HEALTH QUESTIONNAIRE - PHQ9: CLINICAL INTERPRETATION OF PHQ2 SCORE: 0

## 2019-09-25 NOTE — ASSESSMENT & PLAN NOTE
Chronic cigarette use  rx for buproprion provided.   Has shortness of breath, no chest pain  Will request PFT to evaluate for copd.   Vitals stable.

## 2019-09-25 NOTE — ASSESSMENT & PLAN NOTE
Encouraged patient to quit smoking, she tried chantix but in the late afternoons she felt sick to her stomach.   She is motivated to quit. We discussed use wellbutrin.

## 2019-09-25 NOTE — PROGRESS NOTES
Subjective:   Davina Jones is a 49 y.o. female here today for evaluation and management of:     Encounter for smoking cessation counseling  Encouraged patient to quit smoking, she tried chantix but in the late afternoons she felt sick to her stomach.   She is motivated to quit. We discussed use wellbutrin.     Obesity (BMI 30-39.9)  Encouraged regular exercise, continue healthy diet changes.     Hyperlipidemia  Repeat labs ordered.     Vitamin D deficiency  Repeat labs ordered.     Shortness of breath  Chronic cigarette use  rx for buproprion provided.   Has shortness of breath, no chest pain  Will request PFT to evaluate for copd.   Vitals stable.      Mild elevated systolic to 90 and  advised patient to stay well hydrated, quit smoking. She notes BP elevated ever since she started medication for urinary incontinence even though she stopped the medication a month ago.   Recheck next visit.     Current medicines (including changes today)  Current Outpatient Medications   Medication Sig Dispense Refill   • buPROPion SR (ZYBAN) 150 MG SR tablet Take 1 Tab by mouth 2 times a day. 60 Tab 11   • ibuprofen (MOTRIN) 200 MG Tab Take 200 mg by mouth every 6 hours as needed.     • cyclobenzaprine (FLEXERIL) 5 MG tablet Take 1-2 Tabs by mouth 3 times a day as needed. 30 Tab 2   • Mirabegron ER (MYRBETRIQ) 25 MG TABLET SR 24 HR Take 1 Tab by mouth 2 Times a Day.       No current facility-administered medications for this visit.      She  has a past medical history of Bowel habit changes (04/06/2018), Dental disorder (01-04-11), Hepatitis A (1997), Pain (01-04-11), and Urinary bladder disorder. She also has no past medical history of Breast cancer (HCC) or Clotting disorder (HCC).    ROS  No chest pain, no shortness of breath, no abdominal pain       Objective:     /90 (BP Location: Right arm, Patient Position: Sitting, BP Cuff Size: Adult)   Pulse (!) 108   Temp 37.1 °C (98.7 °F) (Temporal)   Resp  "14   Ht 1.626 m (5' 4\")   Wt 89.5 kg (197 lb 6.4 oz)   SpO2 94%  Body mass index is 33.88 kg/m².   Physical Exam:  Constitutional: Alert, no distress.  Skin: Warm, dry, good turgor, no rashes in visible areas.  Eye: Equal, round and reactive, conjunctiva clear, lids normal.  ENMT: Lips without lesions, good dentition, oropharynx clear.  Neck: Trachea midline, no masses, no thyromegaly. No cervical or supraclavicular lymphadenopathy  Respiratory: Unlabored respiratory effort, lungs clear to auscultation, no wheezes, no ronchi.  Cardiovascular: Normal S1, S2, no murmur, no edema.  Abdomen: Soft, non-tender, no masses, no hepatosplenomegaly.  Psych: Alert and oriented x3, normal affect and mood.        Assessment and Plan:   The following treatment plan was discussed    1. Breast cancer screening  - MA-SCREEN MAMMO W/CAD-BILAT; Future    2. Need for vaccination  - Influenza Vaccine Quad Injection (PF)    3. Encounter for smoking cessation counseling  - DX-CHEST-2 VIEWS; Future  - REFERRAL TO PULMONOLOGY    4. Fatigue, unspecified type  - Comp Metabolic Panel; Future  - VITAMIN D,25 HYDROXY; Future  - TSH WITH REFLEX TO FT4; Future    5. Dyslipidemia  - Comp Metabolic Panel; Future  - Lipid Profile; Future    6. Weight gain  - TSH WITH REFLEX TO FT4; Future    7. Shortness of breath  - REFERRAL TO PULMONOLOGY      Followup: Return in about 3 months (around 12/24/2019) for high blood pressure, tachycardia, review labs, smoking cessation.         "

## 2019-10-02 ENCOUNTER — HOSPITAL ENCOUNTER (OUTPATIENT)
Dept: LAB | Facility: MEDICAL CENTER | Age: 49
End: 2019-10-02
Attending: FAMILY MEDICINE
Payer: COMMERCIAL

## 2019-10-02 ENCOUNTER — APPOINTMENT (OUTPATIENT)
Dept: RADIOLOGY | Facility: IMAGING CENTER | Age: 49
End: 2019-10-02
Attending: FAMILY MEDICINE
Payer: COMMERCIAL

## 2019-10-02 DIAGNOSIS — Z71.6 ENCOUNTER FOR SMOKING CESSATION COUNSELING: ICD-10-CM

## 2019-10-02 DIAGNOSIS — R63.5 WEIGHT GAIN: ICD-10-CM

## 2019-10-02 DIAGNOSIS — R53.83 FATIGUE, UNSPECIFIED TYPE: ICD-10-CM

## 2019-10-02 DIAGNOSIS — E78.5 DYSLIPIDEMIA: ICD-10-CM

## 2019-10-02 LAB
25(OH)D3 SERPL-MCNC: 23 NG/ML (ref 30–100)
ALBUMIN SERPL BCP-MCNC: 4.5 G/DL (ref 3.2–4.9)
ALBUMIN/GLOB SERPL: 1.5 G/DL
ALP SERPL-CCNC: 65 U/L (ref 30–99)
ALT SERPL-CCNC: 14 U/L (ref 2–50)
ANION GAP SERPL CALC-SCNC: 7 MMOL/L (ref 0–11.9)
AST SERPL-CCNC: 18 U/L (ref 12–45)
BILIRUB SERPL-MCNC: 0.4 MG/DL (ref 0.1–1.5)
BUN SERPL-MCNC: 11 MG/DL (ref 8–22)
CALCIUM SERPL-MCNC: 9.2 MG/DL (ref 8.5–10.5)
CHLORIDE SERPL-SCNC: 105 MMOL/L (ref 96–112)
CHOLEST SERPL-MCNC: 215 MG/DL (ref 100–199)
CO2 SERPL-SCNC: 27 MMOL/L (ref 20–33)
CREAT SERPL-MCNC: 0.67 MG/DL (ref 0.5–1.4)
FASTING STATUS PATIENT QL REPORTED: NORMAL
GLOBULIN SER CALC-MCNC: 3.1 G/DL (ref 1.9–3.5)
GLUCOSE SERPL-MCNC: 86 MG/DL (ref 65–99)
HDLC SERPL-MCNC: 37 MG/DL
LDLC SERPL CALC-MCNC: 158 MG/DL
POTASSIUM SERPL-SCNC: 4 MMOL/L (ref 3.6–5.5)
PROT SERPL-MCNC: 7.6 G/DL (ref 6–8.2)
SODIUM SERPL-SCNC: 139 MMOL/L (ref 135–145)
TRIGL SERPL-MCNC: 98 MG/DL (ref 0–149)

## 2019-10-02 PROCEDURE — 80061 LIPID PANEL: CPT

## 2019-10-02 PROCEDURE — 82306 VITAMIN D 25 HYDROXY: CPT

## 2019-10-02 PROCEDURE — 36415 COLL VENOUS BLD VENIPUNCTURE: CPT

## 2019-10-02 PROCEDURE — 84443 ASSAY THYROID STIM HORMONE: CPT

## 2019-10-02 PROCEDURE — 80053 COMPREHEN METABOLIC PANEL: CPT

## 2019-10-02 PROCEDURE — 71046 X-RAY EXAM CHEST 2 VIEWS: CPT | Mod: TC,FY | Performed by: FAMILY MEDICINE

## 2019-10-03 LAB — TSH SERPL DL<=0.005 MIU/L-ACNC: 1.39 UIU/ML (ref 0.38–5.33)

## 2019-10-21 ENCOUNTER — TELEMEDICINE2 (OUTPATIENT)
Dept: PULMONOLOGY | Facility: HOSPICE | Age: 49
End: 2019-10-21
Payer: COMMERCIAL

## 2019-10-21 VITALS
DIASTOLIC BLOOD PRESSURE: 98 MMHG | WEIGHT: 195 LBS | BODY MASS INDEX: 33.29 KG/M2 | RESPIRATION RATE: 16 BRPM | OXYGEN SATURATION: 93 % | HEART RATE: 101 BPM | TEMPERATURE: 97.5 F | HEIGHT: 64 IN | SYSTOLIC BLOOD PRESSURE: 136 MMHG

## 2019-10-21 DIAGNOSIS — R06.02 SOB (SHORTNESS OF BREATH): ICD-10-CM

## 2019-10-21 DIAGNOSIS — R63.8 INCREASED BMI: ICD-10-CM

## 2019-10-21 DIAGNOSIS — Z72.0 TOBACCO USE: ICD-10-CM

## 2019-10-21 PROCEDURE — 99204 OFFICE O/P NEW MOD 45 MIN: CPT | Performed by: INTERNAL MEDICINE

## 2019-10-21 RX ORDER — SOLIFENACIN SUCCINATE 10 MG/1
10 TABLET, FILM COATED ORAL
Refills: 0 | COMMUNITY
Start: 2019-10-06 | End: 2022-10-17

## 2019-10-21 RX ORDER — HYDROCODONE BITARTRATE AND ACETAMINOPHEN 5; 325 MG/1; MG/1
TABLET ORAL
Refills: 0 | COMMUNITY
Start: 2019-10-09 | End: 2022-10-17

## 2019-10-21 ASSESSMENT — PAIN SCALES - GENERAL: PAINLEVEL: NO PAIN

## 2019-10-21 NOTE — PROGRESS NOTES
Chief Complaint   Patient presents with   • New Patient     Shortness of Breath       HPI:  The patient is a 49-year-old woman who is being seen in consultation for shortness of breath with activity.  The patient has a history of smoking 1 pack of cigarettes a day for the past 27 years.  She has never been diagnosed formally with COPD or asthma.  When she last saw Dr. Lewis she was placed on bupropion and counseled on smoking cessation.  The patient is actively trying to quit smoking.  She also wants to try a combination of cream of tartar added to orange juice to alter the taste of the cigarettes.  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  While she worked in the warehouse she became short of breath at times with significant physical activity.  Her shortness of breath has gotten worse however.  Since she has stopped working her weight has increased.  She feels that the combination of smoking and increased weight accounts for her increasing shortness of breath.  She has no history of cough, wheezing or hemoptysis.  She has not had pulmonary function testing.  A recent chest x-ray was normal.  She has no history of cardiac problems.  She is not on any inhalers.  She is not on any beta blocking medication.    Past Medical History:   Diagnosis Date   • Bowel habit changes 04/06/2018    constipation and diarrhea intervals   • Dental disorder 01-04-11    cavities and broken teeth   • Hepatitis A 1997   • Pain 01-04-11    pelvic area, 5/10   • Shortness of breath    • Urinary bladder disorder        ROS:   Constitutional: Denies fevers, chills, night sweats, fatigue or weight loss  Eyes: Denies vision loss, pain, drainage, double vision  Ears, Nose, Throat: Denies earache, tinnitus, hoarseness  Cardiovascular: Denies chest pain, tightness, palpitations  Respiratory: See HPI  Sleep: Denies, snoring, apnea  GI: Denies abdominal pain, nausea, vomiting, diarrhea  : Denies frequent  urination, hematuria, painful urination  Musculoskeletal: Denies back pain, painful joints, sore muscles  Neurological: Denies headaches, seizures  Skin: Denies rashes, color changes  Psychiatric: Denies depression or thoughts of suicide  Hematologic: Denies bleeding tendency or clotting tendency  Allergic/Immunologic: Denies rhinitis, skin sensitivity    Social History     Socioeconomic History   • Marital status:      Spouse name: Not on file   • Number of children: Not on file   • Years of education: Not on file   • Highest education level: Not on file   Occupational History   • Not on file   Social Needs   • Financial resource strain: Not on file   • Food insecurity:     Worry: Not on file     Inability: Not on file   • Transportation needs:     Medical: Not on file     Non-medical: Not on file   Tobacco Use   • Smoking status: Current Some Day Smoker     Packs/day: 0.50     Years: 27.00     Pack years: 13.50     Types: Cigarettes     Last attempt to quit: 2012     Years since quittin.1   • Smokeless tobacco: Never Used   • Tobacco comment: 1/2-pack a day/ 27 years   Substance and Sexual Activity   • Alcohol use: Yes     Alcohol/week: 1.2 oz     Types: 1 Glasses of wine, 1 Cans of beer per week     Comment: 2-3 per month   • Drug use: No   • Sexual activity: Yes     Partners: Male   Lifestyle   • Physical activity:     Days per week: Not on file     Minutes per session: Not on file   • Stress: Not on file   Relationships   • Social connections:     Talks on phone: Not on file     Gets together: Not on file     Attends Orthodoxy service: Not on file     Active member of club or organization: Not on file     Attends meetings of clubs or organizations: Not on file     Relationship status: Not on file   • Intimate partner violence:     Fear of current or ex partner: Not on file     Emotionally abused: Not on file     Physically abused: Not on file     Forced sexual activity: Not on file   Other  "Topics Concern   • Not on file   Social History Narrative   • Not on file     Toradol  Current Outpatient Medications on File Prior to Visit   Medication Sig Dispense Refill   • solifenacin (VESICARE) 10 MG tablet Take 10 mg by mouth.  0   • ibuprofen (MOTRIN) 200 MG Tab Take 200 mg by mouth every 6 hours as needed.     • cyclobenzaprine (FLEXERIL) 5 MG tablet Take 1-2 Tabs by mouth 3 times a day as needed. 30 Tab 2   • Mirabegron ER (MYRBETRIQ) 25 MG TABLET SR 24 HR Take 1 Tab by mouth 2 Times a Day.     • HYDROcodone-acetaminophen (NORCO) 5-325 MG Tab per tablet TK 1 TO 2 TS PO Q 4-6 H PRN P FOR 1- DAYS  0   • buPROPion SR (ZYBAN) 150 MG SR tablet Take 1 Tab by mouth 2 times a day. 60 Tab 11     No current facility-administered medications on file prior to visit.      /98   Pulse (!) 101   Temp 36.4 °C (97.5 °F) (Temporal)   Resp 16   Ht 1.626 m (5' 4\")   Wt 88.5 kg (195 lb)   SpO2 93%   Family History   Problem Relation Age of Onset   • Heart Disease Mother    • Hyperlipidemia Mother    • Hypertension Mother    • Lung Disease Father         copd   • Heart Disease Father    • Cancer Sister         lung, smoker   • Diabetes Maternal Grandmother    • Cancer Paternal Grandfather         leukemia       Physical Exam:    HEENT: PERRLA, EOMI, no scleral icterus, no nasal or oral lesions  Neck: No thyromegaly, no adenopathy, no bruits  Mallampatti: Grade II  Lungs: Equal breath sounds, no wheezes or crackles  Heart: Regular rate and rhythm, no gallops or murmurs  Abdomen: Soft, benign, no organomegaly  Extremities: No clubbing, cyanosis, or edema  Neurologic: Cranial nerve, motor, and sensory exam are normal    1. SOB (shortness of breath)    2. Tobacco use    3. Increased BMI        Her increasing shortness of breath has correlated with her increasing weight loss.  We applaud her efforts to discontinue smoking.  We also strongly suggest that she increase her activity with stretching activities at home and " walking at a brisk pace at least 20 minutes/day.  Obviously she needs meal portion control.  We will check pulmonary function testing and see her back in approximately 3 months at which time hopefully she has discontinued smoking and lost some weight.

## 2019-12-30 DIAGNOSIS — R06.02 SOB (SHORTNESS OF BREATH): ICD-10-CM

## 2020-05-15 NOTE — LETTER
"EMPLOYEE’S CLAIM FOR COMPENSATION/ REPORT OF INITIAL TREATMENT  FORM C-4    EMPLOYEE’S CLAIM - PROVIDE ALL INFORMATION REQUESTED   First Name  Davina Last Name  Robert Birthdate                    1970                Sex  female Claim Number   Home Address  [address] [facility] Age  48 y.o. 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 (if applicable)  Picked up a case of rockstar and put it into a box.    How did this injury or occupational disease occur? (Be specific an answer in detail. Use additional sheet if necessary)  Picked up a case of rockstar energy drinks and placed it into a box on a picking cart.Felt a sharp pain shoot through my wrist and it started throbbing.   If you believe that you have an occupational disease, when did you first have knowledge of the disability and it relationship to your employment?  n/a Witnesses to the Accident  none      Nature of Injury or Occupational Disease  Workers' Compensation  Part(s) of Body Injured or Affected  Wrist (L) and Hand (L), ,     I certify that the above is " Per protocol medication refilled. Refill sent to pharmacy.   Sammie Blanchard MA     true and correct to the best of my knowledge and that I have provided this information in order to obtain the benefits of [place]  I hereby authorize any physician, chiropractor, surgeon, practitioner, or other person, any hospital, including [facility] or [facility], any medical service organization, any insurance company, or other institution or organization to release to each other, any medical or other information, including benefits paid or payable, pertinent to this injury or disease, except information relative to diagnosis, treatment and/or counseling for AIDS, psychological conditions, alcohol or controlled substances, for which I must give specific authorization.  A Photostat of this authorization shall be as valid as the original.     Date 12/19/18   [facility]   Employee’s Signature   THIS REPORT MUST BE COMPLETED AND MAILED WITHIN 3 WORKING DAYS OF TREATMENT   Place  [facility]  Name of Facility  [facility]   Date  12/19/2018 Diagnosis  (S66.912A) Wrist strain, left, initial encounter  (Z02.1) Pre-employment drug screening Is there evidence the injured employee was under the influence of alcohol and/or another controlled substance at the time of accident?   Hour  1:19 PM Description of Injury or Disease  Diagnoses of Wrist strain, left, initial encounter and Pre-employment drug screening were pertinent to this visit. No   Treatment  RICE. OTC Ibuprofen prn. Thumb spica splint.  Have you advised the patient to remain off work five days or more? No   X-Ray Findings      If Yes   From Date  To Date      From information given by the employee, together with medical evidence, can you directly connect this injury or occupational disease as job incurred?  Yes If No Full Duty  No Modified Duty  Yes   Is additional medical care by a physician  "indicated?  Yes If Modified Duty, Specify any Limitations / Restrictions  See D-39 for restrictions   Do you know of any previous injury or disease contributing to this condition or occupational disease?                            No   Date  12/19/2018 Print Doctor’s Name Denis Sanchez P.A.-C. I certify the employer’s copy of  this form was mailed on:   Address  [address] [facility] [address] Insurer’s Use Only     [facility] [facility] Zip  [address]    Provider’s Tax ID Number  [id number] Telephone  Dept: [phone]        e-DENIS Greenfield P.A.-C.   e-Signature: Dr. Antonio Smith, Medical Director Degree  MARY        ORIGINAL-TREATING PHYSICIAN OR CHIROPRACTOR    PAGE 2-INSURER/TPA    PAGE 3-EMPLOYER    PAGE 4-EMPLOYEE             Form C-4 (rev10/07)              BRIEF DESCRIPTION OF RIGHTS AND BENEFITS  (Pursuant to [place]RS 616C.050)    Notice of Injury or Occupational Disease (Incident Report Form C-1): If an injury or occupational disease (OD) arises out of and in the  course of employment, you must provide written notice to your employer as soon as practicable, but no later than 7 days after the accident or  OD. Your employer shall maintain a sufficient supply of the required forms.    Claim for Compensation (Form C-4): If medical treatment is sought, the form C-4 is available at the place of initial treatment. A completed  \"Claim for Compensation\" (Form C-4) must be filed within 90 days after an accident or OD. The treating physician or chiropractor must,  within 3 working days after treatment, complete and mail to the employer, the employer's insurer and third-party , the Claim for  Compensation.    Medical Treatment: If you require medical treatment for your on-the-job injury or OD, you may be required to select a physician or  chiropractor from a list provided by your workers’ compensation insurer, if it has contracted with an Organization for Managed Care (MCO) or  Preferred Provider " Organization (PPO) or providers of health care. If your employer has not entered into a contract with an MCO or PPO, you  may select a physician or chiropractor from the Panel of Physicians and Chiropractors. Any medical costs related to your industrial injury or  OD will be paid by your insurer.    Temporary Total Disability (TTD): If your doctor has certified that you are unable to work for a period of at least 5 consecutive days, or 5  cumulative days in a 20-day period, or places restrictions on you that your employer does not accommodate, you may be entitled to TTD  compensation.    Temporary Partial Disability (TPD): If the wage you receive upon reemployment is less than the compensation for TTD to which you are  entitled, the insurer may be required to pay you TPD compensation to make up the difference. TPD can only be paid for a maximum of 24  months.    Permanent Partial Disability (PPD): When your medical condition is stable and there is an indication of a PPD as a result of your injury or  OD, within 30 days, your insurer must arrange for an evaluation by a rating physician or chiropractor to determine the degree of your PPD. The  amount of your PPD award depends on the date of injury, the results of the PPD evaluation and your age and wage.    Permanent Total Disability (PTD): If you are medically certified by a treating physician or chiropractor as permanently and totally disabled  and have been granted a PTD status by your insurer, you are entitled to receive monthly benefits not to exceed 66 2/3% of your average  monthly wage. The amount of your PTD payments is subject to reduction if you previously received a PPD award.    Vocational Rehabilitation Services: You may be eligible for vocational rehabilitation services if you are unable to return to the job due to a  permanent physical impairment or permanent restrictions as a result of your injury or occupational disease.    Transportation and Per Nickie  Reimbursement: You may be eligible for travel expenses and per laina associated with medical treatment.    Reopening: You may be able to reopen your claim if your condition worsens after claim closure.    Appeal Process: If you disagree with a written determination issued by the insurer or the insurer does not respond to your request, you may  appeal to the Department of Administration, , by following the instructions contained in your determination letter. You must  appeal the determination within 70 days from the date of the determination letter at [address], [address], [place], [place]  [address], or [address], [address], [place], [place] [address]. If you disagree with the  decision, you may appeal to the  Department of Administration, . You must file your appeal within 30 days from the date of the  decision  letter at [address], [address], [place], [place] [address], or [address], [address], [place], [place] [address]. If you  disagree with a decision of an , you may file a petition for judicial review with the District Court. You must do so within 30  days of the Appeal Officer’s decision. You may be represented by an  at your own expense or you may contact the [employer] for possible  representation.    [place]  for Injured Workers (NAIW): If you disagree with a  decision, you may request that NAIW represent you  without charge at an  Hearing. For information regarding denial of benefits, you may contact the [employer] at: [address], [address], [place] [address], [phone], or [address] [facility] [address], [place] [address], [phone]    To File a Complaint with the Division: If you wish to file a complaint with the  of the Division of Industrial Relations (DIR),  please contact the Workers’ Compensation Section, [address], [address], [place], [place] [address], telephone [phone]) [phone], or  [address], [address], [place], [place] [address], telephone [phone].    For assistance with Workers’ Compensation Issues: you may contact the Office of the [facility] Consumer Health Assistance, [address] [place] [address], [address], [place], [place] [address], Toll Free 1-644.590.3394, Web site: http://govcha.[facility].nv., E-mail  [email or web address].                                                                                                                                                                                                                                   __________________________________________________________________                                                                   ____12/19/18_____________                Employee Name / Signature                                                                                                                                                       Date                                                                                                                                                                                                     D-2 (rev. 10/07)

## 2022-10-17 ENCOUNTER — TELEPHONE (OUTPATIENT)
Dept: INTERNAL MEDICINE | Facility: OTHER | Age: 52
End: 2022-10-17

## 2022-10-17 ENCOUNTER — OFFICE VISIT (OUTPATIENT)
Dept: INTERNAL MEDICINE | Facility: OTHER | Age: 52
End: 2022-10-17
Payer: MEDICAID

## 2022-10-17 VITALS
BODY MASS INDEX: 34.62 KG/M2 | HEIGHT: 64 IN | WEIGHT: 202.8 LBS | SYSTOLIC BLOOD PRESSURE: 147 MMHG | OXYGEN SATURATION: 95 % | TEMPERATURE: 98 F | DIASTOLIC BLOOD PRESSURE: 92 MMHG | HEART RATE: 91 BPM

## 2022-10-17 DIAGNOSIS — Z12.31 ENCOUNTER FOR SCREENING MAMMOGRAM FOR BREAST CANCER: ICD-10-CM

## 2022-10-17 DIAGNOSIS — M54.50 CHRONIC LEFT-SIDED LOW BACK PAIN WITHOUT SCIATICA: ICD-10-CM

## 2022-10-17 DIAGNOSIS — M54.2 NECK PAIN: ICD-10-CM

## 2022-10-17 DIAGNOSIS — E66.9 OBESITY (BMI 30-39.9): ICD-10-CM

## 2022-10-17 DIAGNOSIS — Z72.0 TOBACCO CONSUMPTION: ICD-10-CM

## 2022-10-17 DIAGNOSIS — E78.01 FAMILIAL HYPERCHOLESTEROLEMIA: ICD-10-CM

## 2022-10-17 DIAGNOSIS — E55.9 VITAMIN D DEFICIENCY: ICD-10-CM

## 2022-10-17 DIAGNOSIS — G89.29 CHRONIC LEFT-SIDED LOW BACK PAIN WITHOUT SCIATICA: ICD-10-CM

## 2022-10-17 DIAGNOSIS — R06.83 SNORING: ICD-10-CM

## 2022-10-17 DIAGNOSIS — R06.00 DYSPNEA, UNSPECIFIED TYPE: ICD-10-CM

## 2022-10-17 DIAGNOSIS — Z11.3 SCREENING FOR STD (SEXUALLY TRANSMITTED DISEASE): ICD-10-CM

## 2022-10-17 DIAGNOSIS — Z12.11 SCREENING FOR COLON CANCER: ICD-10-CM

## 2022-10-17 DIAGNOSIS — T73.2XXA FATIGUE DUE TO EXPOSURE, INITIAL ENCOUNTER: ICD-10-CM

## 2022-10-17 DIAGNOSIS — R03.0 ELEVATED BLOOD PRESSURE READING IN OFFICE WITHOUT DIAGNOSIS OF HYPERTENSION: ICD-10-CM

## 2022-10-17 PROCEDURE — 99204 OFFICE O/P NEW MOD 45 MIN: CPT | Mod: GC | Performed by: STUDENT IN AN ORGANIZED HEALTH CARE EDUCATION/TRAINING PROGRAM

## 2022-10-17 RX ORDER — ALBUTEROL SULFATE 90 UG/1
2 AEROSOL, METERED RESPIRATORY (INHALATION) EVERY 4 HOURS PRN
Qty: 18 G | Refills: 1 | Status: SHIPPED | OUTPATIENT
Start: 2022-10-17 | End: 2023-03-21

## 2022-10-17 ASSESSMENT — PATIENT HEALTH QUESTIONNAIRE - PHQ9: CLINICAL INTERPRETATION OF PHQ2 SCORE: 0

## 2022-10-17 NOTE — PROGRESS NOTES
Reason to establish: New patient to establish    CC: establish care and chronic problems.    HPI:   This is 53 yo female with a history of chronic urinary incontinence (2/2 bladder prolapse), fecal incontinence due to rectocele s/p repair, tobacco use, chronic back pain who is presenting to our clinic to establish care. She has not seen a doctor for 3 years due to insurance issue.    Left lower back pain since 30 years ago, extra bone was noticed in there on imaging according to patient. She does not recall trauma.  She takes Tylenol and ibuprofen for the pain. No numbness or tingling in leg but the pain travels from the back traveling thigh all the way to knee.      Due to chronic shortness of breath, patient saw pulmonologist who ordered pulmonary function test but was not done. She does not have inhaler at home. She denies nocturnal dyspnea, orthopnea.She reports intermittent episodes of wheezing, cough and dyspnea on exertion, which are unchanged.  for 1 year, she gets ligtheaded upon standing from sitting.  Denies chest pain, palpitation, weakness, facial droop, paresthesia, vision change     At baseline, she has 5 bowel movements everyday with loose and slimy stools.  pooping 5 times a day, loose, slimy  In 2019 she had rectocele surgery, and then she was fine for 1 year. The symptom recurred. Dr. Chirinos is the surgeon who saw her and recommended another surgery. Patient did not have the second surgery and got lost to follow up. I instructed patient to follow up with .      She is not working and is currently on disability. She smokes 5 cigarettes a day for 40 years. She lives alone in [place] [place] [facility], lives in her ex-'s house.    Health maintenance:  She had colonoscopy in 2021 found to have 2 polyps, repeat colonoscopy due in 5 years.  She is due for pap smear.      Patient Active Problem List    Diagnosis Date Noted    Shortness of breath 09/24/2019    Renal cyst 07/19/2018    Encounter for  smoking cessation counseling 02/14/2018    Dyspareunia, female 01/22/2018    Screening examination for STD (sexually transmitted disease) 12/28/2017    Hyperlipidemia 11/06/2015    Vitamin D deficiency 11/06/2015    Obesity (BMI 30-39.9) 11/06/2015       Past Medical History:   Diagnosis Date    Bowel habit changes 04/06/2018    constipation and diarrhea intervals    Dental disorder 01-04-11    cavities and broken teeth    Hepatitis A 1997    Pain 01-04-11    pelvic area, 5/10    Shortness of breath     Urinary bladder disorder        Current Outpatient Medications   Medication Sig Dispense Refill    albuterol 108 (90 Base) MCG/ACT Aero Soln inhalation aerosol Inhale 2 Puffs every four hours as needed for Shortness of Breath. 18 g 1    ibuprofen (MOTRIN) 200 MG Tab Take 200 mg by mouth every 6 hours as needed.      cyclobenzaprine (FLEXERIL) 5 MG tablet Take 1-2 Tabs by mouth 3 times a day as needed. 30 Tab 2     No current facility-administered medications for this visit.       Allergies as of 10/17/2022 - Reviewed 10/17/2022   Allergen Reaction Noted    Toradol  01/02/2019       Social History     Socioeconomic History    Marital status:      Spouse name: Not on file    Number of children: Not on file    Years of education: Not on file    Highest education level: Not on file   Occupational History    Not on file   Tobacco Use    Smoking status: Some Days     Packs/day: 0.50     Years: 27.00     Pack years: 13.50     Types: Cigarettes     Last attempt to quit: 9/12/2012     Years since quitting: 10.1    Smokeless tobacco: Never    Tobacco comments:     1/2-pack a day/ 27 years   Vaping Use    Vaping Use: Never used   Substance and Sexual Activity    Alcohol use: Yes     Alcohol/week: 1.2 oz     Types: 1 Glasses of wine, 1 Cans of beer per week     Comment: 2-3 per month    Drug use: No    Sexual activity: Yes     Partners: Male   Other Topics Concern    Not on file   Social History Narrative    Not on file      Social Determinants of Health     Financial Resource Strain: Not on file   Food Insecurity: Not on file   Transportation Needs: Not on file   Physical Activity: Not on file   Stress: Not on file   Social Connections: Not on file   Intimate Partner Violence: Not on file   Housing Stability: Not on file       Family History   Problem Relation Age of Onset    Heart Disease Mother     Hyperlipidemia Mother     Hypertension Mother     Lung Disease Father         copd    Heart Disease Father     Cancer Sister         lung, smoker    Diabetes Maternal Grandmother     Cancer Paternal Grandfather         leukemia       Past Surgical History:   Procedure Laterality Date    RECTOCELE REPAIR  4/13/2018    Procedure: RECTOCELE REPAIR and perinoeplasty;  Surgeon: Jesus Santana M.D.;  Location: SURGERY [facility];  Service: Urology    VAGINAL HYSTERECTOMY TOTAL  1/7/2011    Performed by ANABELLE CORRALES at SURGERY SAME DAY [facility] ORS    CYSTOSCOPY  1/7/2011    Performed by ANABELLE CORRALES at SURGERY SAME DAY [facility] ORS    TUBAL LIGATION  1999    TONSILLECTOMY  1998    MAMMOPLASTY REDUCTION  1995    EYE SURGERY      lasik    HYSTERECTOMY, VAGINAL      still have both ovaries    [place] BREAST REDUCTION         ROS: As per HPI. Additional pertinent systems as noted below.  Constitutional: Negative for chills and fever.   HENT: Negative for ear pain and sore throat.    Eyes: Negative for discharge and redness.   Respiratory: Negative for hemoptysis, and stridor.    Cardiovascular: Negative for chest pain, palpitations and leg swelling.   Gastrointestinal: Negative for abdominal pain, constipation, heartburn, nausea and vomiting.   Genitourinary: Negative for dysuria, flank pain and hematuria.   Musculoskeletal: Negative for falls and myalgias.   Skin: Negative for itching and rash.   Neurological: Negative for seizures, loss of consciousness and headaches.   Endo/Heme/Allergies: Negative for polydipsia. Does not  "bruise/bleed easily.   Psychiatric/Behavioral: Negative for substance abuse and suicidal ideas.       BP (!) 147/92 (BP Location: Left arm, Patient Position: Sitting, BP Cuff Size: Adult)   Pulse 91   Temp 36.7 °C (98 °F) (Temporal)   Ht 1.626 m (5' 4\")   Wt 92 kg (202 lb 12.8 oz)   LMP 01/01/2011   SpO2 95%   BMI 34.81 kg/m²     Physical Exam  Constitutional:       General: She is not in acute distress.     Appearance: She is not ill-appearing or diaphoretic.   HENT:      Head: Normocephalic.      Right Ear: External ear normal.      Left Ear: External ear normal.      Nose: Nose normal. No rhinorrhea.      Mouth/Throat:      Pharynx: No oropharyngeal exudate.   Eyes:      General: No scleral icterus.        Right eye: No discharge.         Left eye: No discharge.      Extraocular Movements: Extraocular movements intact.   Cardiovascular:      Rate and Rhythm: Normal rate and regular rhythm.      Heart sounds: Normal heart sounds. No murmur heard.  Pulmonary:      Effort: Pulmonary effort is normal. No respiratory distress.      Breath sounds: Normal breath sounds. No wheezing or rales.   Abdominal:      General: Abdomen is flat. There is no distension.      Palpations: Abdomen is soft.      Tenderness: There is no abdominal tenderness. There is no guarding or rebound.   Musculoskeletal:         General: Tenderness present. No swelling.      Cervical back: Normal range of motion and neck supple.      Right lower leg: No edema.      Left lower leg: No edema.      Comments: Tender to palpation in left lower back. Negative straight leg raise test.   Skin:     General: Skin is warm and dry.      Capillary Refill: Capillary refill takes less than 2 seconds.      Coloration: Skin is not jaundiced.   Neurological:      General: No focal deficit present.      Mental Status: She is alert and oriented to person, place, and time. Mental status is at baseline.   Psychiatric:         Mood and Affect: Mood normal.         " Behavior: Behavior normal.         Thought Content: Thought content normal.         Judgment: Judgment normal.       Note: I have reviewed all pertinent labs and diagnostic tests associated with this visit with specific comments listed under the assessment and plan below    Assessment and Plan    1. Elevated blood pressure reading in office without diagnosis of hypertension  Blood pressure elevated on repeat measurement    Plan:  If blood work looks good, we can start lisionpril at low dose  instructed patient to measure blood pressure and bring the log to next visit.   DASH diet  - Lipid Profile; Future  - CBC WITH DIFFERENTIAL; Future  - MICROALBUMIN CREAT RATIO URINE; Future    2. Fatigue due to exposure, initial encounter  Concern for sleep apnea  - TSH+FREE T4  - Referral to Pulmonary and Sleep Medicine    3. Snoring  Concern for sleep apnea  - TSH+FREE T4  - Referral to Pulmonary and Sleep Medicine    4. Neck pain  Chronic. With numbness in R hand.  - DX-CERVICAL SPINE-2 OR 3 VIEWS; Future    5. Chronic left-sided low back pain without sciatica  Chronic for 30 years. Atraumatic. There was a bony finding? according to patient on previous imaging.    Plan:  - Referral to Physical Therapy  - Referral to Pain Management  - DX-LUMBAR SPINE-2 OR 3 VIEWS; Future    6. Dyspnea, unspecified type  Due to chronic shortness of breath, patient saw pulmonologist who ordered pulmonary function test but was not done. She does not have inhaler at home. She denies nocturnal dyspnea, orthopnea.She reports intermittent episodes of wheezing, cough and dyspnea on exertion, which are unchanged    Plan:  - PULMONARY FUNCTION TESTS -Test requested: Complete Pulmonary Function Test; Future    7. Obesity (BMI 30-39.9)  - Patient identified as having weight management issue.  Appropriate orders and counseling given.  - Referral to Nutrition Services  - Comp Metabolic Panel (fasting); Future  - HEMOGLOBIN A1C; Future  - Lipid Profile;  Future  - CBC WITH DIFFERENTIAL; Future    8. Encounter for screening mammogram for breast cancer  - MA-SCREENING MAMMO BILAT W/TOMOSYNTHESIS W/CAD; Future    9. Screening for colon cancer  Colonoscopy done in 2021 found to have 2 polyps, repeat colonoscopy due in 5 years.    10. Vitamin D deficiency  - VITAMIN D,25 HYDROXY (DEFICIENCY); Future    11. Familial hypercholesterolemia  - Lipid Profile; Future    12. Screening for STD (sexually transmitted disease)  - HIV AG/AB COMBO ASSAY SCREENING; Future  - RPR (SYPHILIS); Future  - Chlamydia/GC, PCR (Urine); Future     Followup: Return in about 4 weeks (around 11/14/2022).    Signed by: Herminio Naidu M.D.

## 2022-10-17 NOTE — PATIENT INSTRUCTIONS
-Measure blood pressure once daily at the same time of the day. No smoking, alcohol, drugs before measuring blood pressure.  Rest for 10 minutes before measuring blood pressure with feet down on the ground and arm at heart level in sitting position. Measure blood pressure for 2 weeks before next visit. Bring the blood pressure log on next visit.    -start DASH diet  -placed referrals for sleep medicine, pulmonary function test, nutrition, physical therapy  -ordered mammogram, x ray neck and back, please schedule them  -please schedule a visit for pap smear.  -Follow up with  for fecal incontinence.

## 2022-10-26 ENCOUNTER — TELEPHONE (OUTPATIENT)
Dept: HEALTH INFORMATION MANAGEMENT | Facility: OTHER | Age: 52
End: 2022-10-26
Payer: MEDICAID

## 2022-10-31 ENCOUNTER — OFFICE VISIT (OUTPATIENT)
Dept: PHYSICAL MEDICINE AND REHAB | Facility: MEDICAL CENTER | Age: 52
End: 2022-10-31
Payer: MEDICAID

## 2022-10-31 DIAGNOSIS — Z91.199 NO-SHOW FOR APPOINTMENT: ICD-10-CM

## 2022-10-31 PROCEDURE — 99999 PR NO CHARGE: CPT | Performed by: STUDENT IN AN ORGANIZED HEALTH CARE EDUCATION/TRAINING PROGRAM

## 2023-01-15 NOTE — ED NOTES
Discharge instructions given to pt; pt verbalized understanding. VSS prior to discharge. Pt ambulatory w/ steady gait upon leaving ED.    Follow-up Pulmonary Progress Note  Chief Complaint : Shortness of breath       Patient seen and examined  comfortable  no cp, sob, palp, n/v  no cough , secretions    Allergies :No Known Allergies      PAST MEDICAL & SURGICAL HISTORY:  GERD (gastroesophageal reflux disease)    Bronchitis  winter 2014    Pneumonia    Basal cell carcinoma  removed    Hyperlipidemia    DVT (deep venous thrombosis)  &quot;right lower extremity,after meniscus repair  2014 &amp; Had Hemato consult (Prothrombin gene mutation study was normal+LA),treated with Xarelto&quot; &amp; currently on xarelto.    COPD (chronic obstructive pulmonary disease)    S/P laminectomy      S/P knee surgery   left meniscus  -right meniscus    H/O total knee replacement, left          Medications:  MEDICATIONS  (STANDING):  atorvastatin 20 milliGRAM(s) Oral at bedtime  benzonatate 100 milliGRAM(s) Oral every 8 hours  budesonide 160 MICROgram(s)/formoterol 4.5 MICROgram(s) Inhaler 2 Puff(s) Inhalation two times a day  cefepime   IVPB 1000 milliGRAM(s) IV Intermittent every 8 hours  celecoxib 200 milliGRAM(s) Oral daily  enoxaparin Injectable 40 milliGRAM(s) SubCutaneous every 24 hours  pantoprazole    Tablet 40 milliGRAM(s) Oral before breakfast  tiotropium 2.5 MICROgram(s) Inhaler 2 Puff(s) Inhalation daily    MEDICATIONS  (PRN):  acetaminophen     Tablet .. 650 milliGRAM(s) Oral every 6 hours PRN Temp greater or equal to 38C (100.4F), Mild Pain (1 - 3)  albuterol    90 MICROgram(s) HFA Inhaler 2 Puff(s) Inhalation every 6 hours PRN Shortness of Breath and/or Wheezing  aluminum hydroxide/magnesium hydroxide/simethicone Suspension 30 milliLiter(s) Oral every 4 hours PRN Dyspepsia  guaifenesin/dextromethorphan Oral Liquid 10 milliLiter(s) Oral every 4 hours PRN Cough  melatonin 3 milliGRAM(s) Oral at bedtime PRN Insomnia  ondansetron Injectable 4 milliGRAM(s) IV Push every 8 hours PRN Nausea and/or Vomiting      Antibiotics History  azithromycin  IVPB 500 milliGRAM(s) IV Intermittent once, 23 @ 12:40, Stop order after: 1 Doses  cefepime   IVPB 1000 milliGRAM(s) IV Intermittent every 8 hours, 23 @ 17:00, Stop order after: 7 Days  cefTRIAXone   IVPB 1000 milliGRAM(s) IV Intermittent once, 23 @ 12:40, Stop order after: 1 Doses      Heme Medications   enoxaparin Injectable 40 milliGRAM(s) SubCutaneous every 24 hours, 23 @ 18:03      GI Medications  aluminum hydroxide/magnesium hydroxide/simethicone Suspension 30 milliLiter(s) Oral every 4 hours, 23 @ 17:24, Routine PRN  pantoprazole    Tablet 40 milliGRAM(s) Oral before breakfast, 23 @ 17:17, Routine        LABS:                        13.4   12.86 )-----------( 217      ( 15 Rubens 2023 06:45 )             41.9     01-15    140  |  104  |  12  ----------------------------<  172<H>  3.9   |  27  |  1.00    Ca    8.9      15 Rubens 2023 06:45    TPro  6.9  /  Alb  3.5  /  TBili  0.4  /  DBili  x   /  AST  17  /  ALT  27  /  AlkPhos  86           PT/INR - ( 2023 12:40 )   PT: 12.3 sec;   INR: 1.06 ratio         PTT - ( 2023 12:40 )  PTT:25.7 sec  Urinalysis Basic - ( 2023 16:50 )    Color: Yellow / Appearance: Clear / S.005 / pH: x  Gluc: x / Ketone: Negative  / Bili: Negative / Urobili: Negative   Blood: x / Protein: 15 / Nitrite: Negative   Leuk Esterase: Negative / RBC: Negative /HPF / WBC Negative /HPF   Sq Epi: x / Non Sq Epi: Neg.-Few / Bacteria: Negative /HPF      Procalcitonin, Serum: 1.10 ng/mL (23 @ 12:40)    Serum Pro-Brain Natriuretic Peptide: 149 pg/mL (23 @ 12:40)        CULTURES: (if applicable)    Culture - Sputum (collected 23 @ 15:05)  Source: .Sputum Sputum  Gram Stain (01-15-23 @ 07:03):    Few polymorphonuclear leukocytes per low power field    No Squamous epithelial cells per low power field    Rare Gram positive cocci in pairs seen per oil power field    Culture - Urine (collected 23 @ 16:50)  Source: Clean Catch Clean Catch (Midstream)  Final Report (23 23:50):    <10,000 CFU/mL Normal Urogenital Laurie    Culture - Blood (collected 23 @ 12:45)  Source: .Blood Blood-Peripheral  Preliminary Report (23 20:01):    No growth to date.    Culture - Blood (collected 23 @ 12:40)  Source: .Blood Blood-Peripheral  Preliminary Report (23 20:01):    No growth to date.      Rapid RVP Result: NotDetec (23 @ 15:05)          VITALS:  T(C): 36.3 (01-15-23 @ 05:35), Max: 36.7 (23 @ 12:43)  T(F): 97.4 (01-15-23 @ 05:35), Max: 98 (23 @ 12:43)  HR: 100 (01-15-23 @ 08:54) (84 - 100)  BP: 113/76 (01-15-23 @ 05:35) (113/76 - 139/87)  BP(mean): --  ABP: --  ABP(mean): --  RR: 20 (01-15-23 @ 05:35) (19 - 20)  SpO2: 94% (01-15-23 @ 08:54) (92% - 94%)  CVP(mm Hg): --  CVP(cm H2O): --    Ins and Outs     01-15-23 @ 07:01  -  01-15-23 @ 11:07  --------------------------------------------------------  IN: 500 mL / OUT: 0 mL / NET: 500 mL        Height (cm): 188 (23 21:15)  Weight (kg): 129.3 (23 21:15)  BMI (kg/m2): 36.6 (23 @ 21:15)        I&O's Detail    15 Rubens 2023 07:01  -  15 2023 11:07  --------------------------------------------------------  IN:    Oral Fluid: 500 mL  Total IN: 500 mL    OUT:  Total OUT: 0 mL    Total NET: 500 mL

## 2023-03-17 ENCOUNTER — APPOINTMENT (OUTPATIENT)
Dept: SLEEP MEDICINE | Facility: MEDICAL CENTER | Age: 53
End: 2023-03-17
Attending: STUDENT IN AN ORGANIZED HEALTH CARE EDUCATION/TRAINING PROGRAM
Payer: MEDICAID

## 2023-03-21 RX ORDER — ALBUTEROL SULFATE 90 UG/1
AEROSOL, METERED RESPIRATORY (INHALATION)
Qty: 18 G | Refills: 0 | Status: SHIPPED | OUTPATIENT
Start: 2023-03-21

## 2023-08-24 ENCOUNTER — PATIENT MESSAGE (OUTPATIENT)
Dept: HEALTH INFORMATION MANAGEMENT | Facility: OTHER | Age: 53
End: 2023-08-24

## 2024-04-01 NOTE — ED PROVIDER NOTES
"ED Provider Note    Scribed for Maik Smith M.D. by Trupti Murray. 2/13/2018  8:42 PM    Primary care provider: Lisa Lewis M.D.  Means of arrival: walk in   History obtained from: patient   History limited by: none     CHIEF COMPLAINT  Chief Complaint   Patient presents with   • Rectal Pain   • Vaginal Pain       HPI  Davina Jones is a 47 y.o. female with a medical history of rectal and bladder prolapse who presents to the Emergency Department with complaints of rectal and vaginal pain onset one month ago. Patient reports associated lower abdominal pain, vaginal and rectal spotting/ discharge. She reports feeling \"pressure\" in her lower abdomen. Patient states that she has been \"pushing them back in all day today and that it feels as though her rectum is going to fall out of her vagina\". She has not had a bowel movement today. Patient denies fevers. No antibiotics recently. She is being treated by Dr. Santana and has an appointment to see him in March. No other acute complaints or concerns.     REVIEW OF SYSTEMS  Pertinent positives include rectal pain, vaginal pain, lower abdominal pain, vaginal and rectal spotting/discharge. Pertinent negatives include no fevers. E    PAST MEDICAL HISTORY   has a past medical history of Dental disorder (01-04-11); Hepatitis A (1997); and Pain (01-04-11).    SURGICAL HISTORY   has a past surgical history that includes mammoplasty reduction (1995); tonsillectomy (1998); tubal ligation (1999); vaginal hysterectomy total (1/7/2011); cystoscopy (1/7/2011); reduction of large breast; hysterectomy, vaginal; and eye surgery.    SOCIAL HISTORY  Social History   Substance Use Topics   • Smoking status: Current Some Day Smoker     Packs/day: 0.50     Years: 27.00     Types: Cigarettes     Last attempt to quit: 9/12/2012   • Smokeless tobacco: Never Used      Comment: 1/2-pack a day/ 27 years   • Alcohol use 1.2 oz/week     1 Glasses of wine, 1 Cans of beer per week      " "Comment: occassional with dinner      History   Drug Use No       FAMILY HISTORY  Family History   Problem Relation Age of Onset   • Heart Disease Mother    • Hyperlipidemia Mother    • Hypertension Mother    • Lung Disease Father      copd   • Heart Disease Father    • Cancer Sister      lung, smoker   • Diabetes Maternal Grandmother    • Cancer Paternal Grandfather      leukemia       CURRENT MEDICATIONS  Current medications can be reviewed in the nurse's note.     ALLERGIES  No Known Allergies    PHYSICAL EXAM  VITAL SIGNS: BP (!) 166/96   Pulse 90   Temp 36 °C (96.8 °F)   Resp 16   Ht 1.626 m (5' 4\")   Wt 84.2 kg (185 lb 10 oz)   LMP 01/01/2011   SpO2 97%   BMI 31.86 kg/m²   Pulse ox interpretation: Normal   Constitutional: Well developed, Well nourished, No acute distress, Non-toxic appearance.   Cardiovascular: Normal heart rate, Normal rhythm  Thorax & Lungs:  No respiratory distress  Abdomen: Bowel sounds normal, Soft, No masses, No pulsatile masses.   : Rectocele and cystocele. Not protruding out side of the body beyond the introitus.  Skin: Warm, Dry, No erythema, No rash.   Extremities: Intact distal pulses, No edema, No cyanosis, No clubbing.       COURSE & MEDICAL DECISION MAKING  Pertinent Labs & Imaging studies reviewed. (See chart for details)    8:42 PM - Patient seen and examined at bedside. Informed the patient that I will perform and rectal and pelvic exam for further evaluation. She understands.     8:46 PM- Performed rectal and pelvic exam with female nurse present.     9:22 PM- Patient is stable for discharge. Instructed the patient on return to ED precautions and she agrees to be discharged home.     Decision Making:  This is a 47 y.o. year old female who presents with concerns for prolapse of bladder and rectum outside of the vagina. The patient does have a prior history of hysterectomy. Followed by urology for her rectocele and cystocele. Has outpatient management scheduled " including an appointment on Thursday for further preoperative testing.    On bedside evaluation, the patient does not have protrusion of organs outside of the body. There is slight rectocele and cystocele observed on physical exam. Easily reducible. Not protruding outside of the body. Patient appears stable for discharge and further outpatient management of this problem. Does not appear to require further emergency interventions or emergency surgery at this time.    The patient will return for new or worsening symptoms and is stable at the time of discharge. Patient was given return precautions. Patient and/or family member verbalizes understanding and will comply.    DISPOSITION:  Patient will be discharged home in stable condition.    FOLLOW UP:  Jesus Santana M.D.  [address]  [facility] [address]  [phone]    Schedule an appointment as soon as possible for a visit      [facility] Emergency Dept  [facility] [facility] [address] [phone]    As needed, If symptoms worsen    FINAL IMPRESSION  1. Cystocele with rectocele        ITrupti (Scribe), am scribing for, and in the presence of, Maik Smith M.D..    Electronically signed by: Trupti Murray (Scribe), 2/13/2018    IMaik M.D. personally performed the services described in this documentation, as scribed by Trupti Murray in my presence, and it is both accurate and complete.    This dictation has been created using voice recognition software and/or scribes. The accuracy of the dictation is limited by the abilities of the software and the expertise of the scribes. I expect there may be some errors of grammar and possibly content. I made every attempt to manually correct the errors within my dictation. However, errors related to voice recognition software and/or scribes may still exist and should be interpreted within the appropriate context.    The note accurately reflects work and decisions made by me.   Maik Smith  2/14/2018  2:12 AM         01-Apr-2024

## 2024-06-05 ENCOUNTER — DOCUMENTATION (OUTPATIENT)
Dept: HEALTH INFORMATION MANAGEMENT | Facility: OTHER | Age: 54
End: 2024-06-05
Payer: MEDICARE

## 2024-11-19 NOTE — PROGRESS NOTES
Urogynecology & Reconstructive Pelvic Surgery - Consultation Visit    Davina Joens [id number] :1970    Referred by: Dr. Garfield Giron ([facility])    Reason for Visit: No chief complaint on file.        Subjective     History of Presenting Illness:    Davina Jones is a 54 y.o. P***3 with HTN, LBP, tobacco use who presents for the evaluation and management of urinary incontinence and pelvic organ prolapse      Multiple urologist and medications.  Leaks with urge and cough, laugh and sneeze.     Prior Pelvic surgery:   2018 Rectocele repair  Appy       Prior treatment:   ***    Fluid intake:   ***    Pelvic floor symptom review:     Bladder:   Voids per day: *** Voids per night: ***      Urinary incontinence episodes per day: ***    Urge leakage:  {none/on movement to bathroom/full bladder:54826}   Stress leakage: {none/with cough/with laugh/with excercise/..:03150}   Continuous / insensible urine loss: {YES/NO:}   Nocturnal enuresis: {YES/NO:}   Leakage volume: {drops/moderate/large...:75635}   Number of pads/day: ***    Bladder emptying: {complete/incomplete:47589}   Voiding symptoms: {none/hesitancy/post-void...:29692}   UTI in last 12 months: ***   Other urologic history: ***      Prolapse:     Prolapse symptoms: {none/bulge/exteriorized:04387}   Degree of prolapse: {to introitus/beyond introitus/..:65359}   Duration of prolapse symptoms: ***      Bowel:    Constipation: {YES/NO:}   Bowel movements per day: *** , stool quality: ***   Straining to empty bowels: {YES/NO:}   Splinting to evacuate: {YES/NO:}   Painful evacuation: {YES/NO:}   Difficulty emptying rectum: {YES/NO:}   Incontinence to stool: {YES/NO:}   Blood in stool: {YES/NO:}   Hemorrhoids: {YES/NO:}   Bowel conditions: {IBS/crohns/uc/celiac/cancer/..:55866}   Most recent colonoscopy: ***       Sexual function:    Sexually active:  {YES/NO:}   Gender of partners: {male/female/trans-male/trans...:37698}   Pain with intercourse: {Yes/No:49511}   History of abuse: {YES/NO:}       Pelvic Pain: ***      Past medical and surgical history    Past obstetric history   Number of vaginal deliveries: ***   Number of  deliveries: ***   History of vacuum/forceps: {YES/NO:}   History of obstetric anal sphincter injury: {YES/NO:}    Past gynecological history:    Last menstrual period/Menopause: Patient's last menstrual period was 2011.   History of abnormal uterine bleeding: {YES/NO:}   History of fibroids: {YES/NO:}   History of endometrial polyps:  {YES/NO:}   History of endometriosis: {YES/NO:}   History of cervical dysplasia: {YES/NO:}   Last pap: ***   Current contraception: ***      Past medical history:  Past Medical History:   Diagnosis Date    Bowel habit changes 2018    constipation and diarrhea intervals    Dental disorder 11    cavities and broken teeth    Pain 11    pelvic area, 5/10    Hepatitis A     Shortness of breath     Urinary bladder disorder      Past surgical history:  Past Surgical History:   Procedure Laterality Date    RECTOCELE REPAIR  2018    Procedure: RECTOCELE REPAIR and perinoeplasty;  Surgeon: Jesus Santana M.D.;  Location: SURGERY [facility];  Service: Urology    VAGINAL HYSTERECTOMY TOTAL  2011    Performed by ANABELLE CORRAELS at SURGERY SAME DAY [facility]    CYSTOSCOPY  2011    Performed by ANABELLE CORRALES at SURGERY SAME DAY [facility]    TUBAL LIGATION      TONSILLECTOMY      MAMMOPLASTY REDUCTION      EYE SURGERY      lasik    HYSTERECTOMY, VAGINAL      still have both ovaries    NE BREAST REDUCTION       Medications:has a current medication list which includes the following prescription(s): ventolin hfa, ibuprofen, and cyclobenzaprine.  Allergies:Toradol  Family history:  Family History   Problem  "Relation Age of Onset    Heart Disease Mother     Hyperlipidemia Mother     Hypertension Mother     Lung Disease Father         copd    Heart Disease Father     Cancer Sister         lung, smoker    Diabetes Maternal Grandmother     Cancer Paternal Grandfather         leukemia     Social history: reports that she has been smoking cigarettes. She started smoking about 39 years ago. She has a 13.5 pack-year smoking history. She has never used smokeless tobacco. She reports current alcohol use of about 1.2 oz of alcohol per week. She reports that she does not use drugs.    Review of systems: A full review of systems was performed, and negative with the exception of want is noted above in the HPI.        Objective        LMP 01/01/2011     Physical Exam ***    Genitourinary:    Vulva: {wnl/atrophic/lichen sclerosis/...:62858}   Bulbocavernosus reflex: {Intact/Absent:35206}   Anal wink reflex: {Intact/Absent:79394}   Perineal sensation: {wnl/decreased/asymmetrical/...:21271}   Urethra: {wnl/atrophic/caruncle/prolapse/...:67586}   Vagina: {wnl/atrophic/friable/stenotic/...:88882}   Atrophy: {Desc; none/mild/moderate/severe:516312::\"None\"}   Cough stress test: {not performed/positive/negative:56152}    Chaperone was present throughout the physical exam.     Pelvic floor:    POP-Q:   Aa *** Ba *** C ***   GH *** PB *** TVL***   Ap *** Bp *** D ***      Prolapse stage: ***   Paravaginal defect: {left/right/bilateral:22705}   Cervical elongation: {YES/NO:20266}   Urethral tenderness: {YES/NO:20266}   Bladder/ suprapubic tenderness: {YES/NO:20266}   Levator tenderness: {none/left/right/bilateral:95224}   Levator muscle tone: {wnl/hypertonic/hypotonic:99117}   Pelvic floor contraction strength (modified Oxford scale): {0=none/1=flicker/2=weak/...:34019}   Pelvic floor contraction duration: {absent/brief/normal:32272}   Bimanual exam: {small, mobile uterus/ bulky uterus/...:52935}   Anal resting tone: " {wnl/absent/decreased/...:85560}   Anal squeeze tone: {wnl/absent/decreased/...:22582}   Palpable anal sphincter defect: {YES/NO:20266}   Granulation tissue: {YES/NO:20266}   Epithelial erosions: {YES/NO:20266}   Epithelial ulcerations: {YES/NO:20266}   Fistula: {none/apical/anterior/...:33438}   Rectal: deferred***   Vaginal band/stricture: {YES/NO:20266}    Procedure Performed: {no/bladder instillation/urethral......:89408}    Chaperone was present throughout the physical exam.     Diagnostic test and records review:    Urine dipstick: ***     Post-void residual: *** mL, performed by {bladder scanner/cath....:52919}    Labs:    Lab Results   Component Value Date/Time    SODIUM 139 10/02/2019 0942    POTASSIUM 4.0 10/02/2019 0942    CHLORIDE 105 10/02/2019 0942    CO2 27 10/02/2019 0942    GLUCOSE 86 10/02/2019 0942    BUN 11 10/02/2019 0942    CREATININE 0.67 10/02/2019 0942    CALCIUM 9.2 10/02/2019 0942    ANION 7.0 10/02/2019 0942       Lab Results   Component Value Date/Time    WBC 10.1 06/08/2018 08:28 AM    RBC 4.71 06/08/2018 08:28 AM    HEMOGLOBIN 14.1 06/08/2018 08:28 AM    MCV 88.3 06/08/2018 08:28 AM    MCH 29.9 06/08/2018 08:28 AM    MCHC 33.9 06/08/2018 08:28 AM    RDW 42.9 06/08/2018 08:28 AM    MPV 11.9 06/08/2018 08:28 AM         Radiology: None    Procedural: None    Documentation reviewed: Home Medications    Outside records reviewed: 18 pages      Assessment & Plan     Davina Jones is a 54 y.o. P*** with ***. We discussed my recommendations for further diagnosis and treatment at length today.     Assessment & Plan        Tyra Bueno MD  Urogynecology and Reconstructive Pelvic Surgery  Department of Obstetrics and Gynecology  [facility]

## 2024-11-20 ENCOUNTER — APPOINTMENT (OUTPATIENT)
Dept: GYNECOLOGY | Facility: CLINIC | Age: 54
End: 2024-11-20
Payer: MEDICARE

## 2024-11-20 ENCOUNTER — TELEPHONE (OUTPATIENT)
Dept: OBGYN | Facility: CLINIC | Age: 54
End: 2024-11-20

## 2024-11-20 NOTE — TELEPHONE ENCOUNTER
Caller Name: Davina Jones   Call Back Number: [phone]     How would the patient prefer to be contacted with a response: Phone call do NOT leave a detailed message    Pt was called to see if she would like to come in today at 11:15am for her appointment. I did let the pt know that if that time does not work we can keep her original scheduled time of 1:30pm. Pt informed me she may not be able to make it into her appointment today and would like to be rescheduled. Pt was rescheduled per reference. Pt wanted to be scheduled after thanksgiving. I scheduled and informed her to arrive 15 minutes early for her new scheduled appointment. Pt verbalized understanding.

## 2024-12-10 ENCOUNTER — APPOINTMENT (OUTPATIENT)
Dept: GYNECOLOGY | Facility: CLINIC | Age: 54
End: 2024-12-10
Payer: MEDICARE

## 2024-12-10 VITALS
BODY MASS INDEX: 33.43 KG/M2 | HEIGHT: 64 IN | SYSTOLIC BLOOD PRESSURE: 145 MMHG | WEIGHT: 195.8 LBS | HEART RATE: 90 BPM | DIASTOLIC BLOOD PRESSURE: 96 MMHG

## 2024-12-10 DIAGNOSIS — N30.10 INTERSTITIAL CYSTITIS: ICD-10-CM

## 2024-12-10 DIAGNOSIS — N39.3 SUI (STRESS URINARY INCONTINENCE, FEMALE): ICD-10-CM

## 2024-12-10 DIAGNOSIS — N81.10 CYSTOCELE, UNSPECIFIED: ICD-10-CM

## 2024-12-10 DIAGNOSIS — N32.81 OVERACTIVE BLADDER: ICD-10-CM

## 2024-12-10 DIAGNOSIS — I10 HYPERTENSION, UNSPECIFIED TYPE: ICD-10-CM

## 2024-12-10 DIAGNOSIS — R15.9 INCONTINENCE OF FECES WITH FECAL URGENCY: ICD-10-CM

## 2024-12-10 DIAGNOSIS — N39.41 URGENCY INCONTINENCE: ICD-10-CM

## 2024-12-10 DIAGNOSIS — N99.3 VAGINAL VAULT PROLAPSE AFTER HYSTERECTOMY: ICD-10-CM

## 2024-12-10 DIAGNOSIS — R15.2 INCONTINENCE OF FECES WITH FECAL URGENCY: ICD-10-CM

## 2024-12-10 PROCEDURE — 99459 PELVIC EXAMINATION: CPT | Performed by: STUDENT IN AN ORGANIZED HEALTH CARE EDUCATION/TRAINING PROGRAM

## 2024-12-10 PROCEDURE — 99205 OFFICE O/P NEW HI 60 MIN: CPT | Performed by: STUDENT IN AN ORGANIZED HEALTH CARE EDUCATION/TRAINING PROGRAM

## 2024-12-10 RX ORDER — LOSARTAN POTASSIUM 25 MG/1
25 TABLET ORAL DAILY
COMMUNITY

## 2024-12-10 RX ORDER — MELOXICAM 15 MG/1
7.5 TABLET ORAL DAILY
COMMUNITY

## 2024-12-10 NOTE — PROGRESS NOTES
PT here today for consult   Ref for for ABDIAS  UTI Sx: no  Hysterectomy: yes  Good #: [phone]   PVR : 30 mL   Pharmacy Verified

## 2024-12-10 NOTE — PATIENT INSTRUCTIONS
IC: consider adding allergy medicine once a day  Fecal leaking: increase fiber by 1 spoonful for 1 week. Add yogurt to decrease bloating  OAB: best options is pelvic floor exercises +/- botox   Stress incontinence: consider pessary (vaginal insert) vs surgery   Prolapse: pessary vs surgery  Smoking: stop. It does increase risk of surgery, mesh complication. Blood clots.              UPDATE: For current bulking procedure (differs from pamphlet), this is performed in the OR under light anesthesia, and you go home the same day

## 2024-12-10 NOTE — PROGRESS NOTES
Urogynecology & Reconstructive Pelvic Surgery - Consultation Visit    Davina Jones MRN:[id number] :1970    Referred by: Dr. Garfield Giron ([facility])    Reason for Visit:   Chief Complaint   Patient presents with    New Patient     Ref for ABDIAS         Subjective     History of Presenting Illness:  Davina Jones is a 54 y.o. P3 with HTN, LBP, tobacco use (1/2PPD) who presents for the evaluation and management of urinary incontinence and pelvic organ prolapse    Interstitial cystitis - no current treatment. Did try IC diet that helped but difficult to maintain. BIs did no help and not feasible as she lives in [place]. Elmiron was too expensive. Felt like wygovy helped a lot, but the cost of this also increased and she has not taken it recently.   Typical IC sx: stabbing bladder pain, unsure if worse with full, no burning with urination, no hematuria, incomplete bladder emptying during flares, urgency, frequency. UTI tests neg. H/o kidney stones.   Triggers: stress (lives with ex), sodas     Leaks with urge trying to get to the bathroom. Also leaks cough, laugh and sneeze.  UUI bothers her more than ABDIAS.     Occasionally feels tissue at the entrance of her vagina with wiping. Feels bulge/pressure.     Fiber spoonful daily.     Has not taken her BP in a few days. Denies chest pain, SOB, HA or vision changes.     Prior Pelvic surgery:    BTL   TVH, BS - pelvic pain ([facility] surgeon, not [facility])  2018 Rectocele repair ([facility] [place], [place] moved to [place]) - prolapse, c/b left nerve damage still have weakness going up stairs, vagina goes numbs sometime on the left   Appy     Prior treatment:   PFPT  Kegels  Oxybutynin - Developed SE (dry eye, mouth and constipation, dizziness, N)  Trospium - Developed SE (dry eye, mouth and constipation, dizziness, N)  Tolterodine - Developed SE (dry eye, mouth and constipation, dizziness, N)  Solifenacin - Developed SE (dry eye, mouth and  constipation, dizziness, N)  Mirabegon - developed SE  Elmiron - too expensive   BIs - no improvement     Fluid intake:   Water: 32oz   Coffee:     Pelvic floor symptom review:     Bladder:   Voids per day: 10 Voids per night: 2      Urinary incontinence episodes per day: 3-4    Urge leakage:  On Movement to Bathroom   Stress leakage: With Cough and With Laugh   Continuous / insensible urine loss: Yes   Nocturnal enuresis: Yes   Leakage volume: Drops   Number of pads/day: 0    Bladder emptying: Incomplete sometimes    Voiding symptoms: Post-Void Dribble leans forward to void    UTI in last 12 months: 0   Other urologic history: h/o stones       Prolapse:     Prolapse symptoms: Bulge, Exteriorized Tissue, and Pelvic Pressure   Degree of prolapse: To Introitus   Duration of prolapse symptoms: 2017      Bowel:    Constipation: Yes   Bowel movements per day: q2-3d , stool quality: mush-liquid joanie with Wygovy    Straining to empty bowels: Yes   Splinting to evacuate: No    Painful evacuation: No    Difficulty emptying rectum: Yes   Incontinence to stool: Yes 1x/week with urgency, stool mush-soft    Blood in stool: No    Hemorrhoids: Yes   Bowel conditions: none   Most recent colonoscopy:        Sexual function:    Sexually active: No lack of interest   Gender of partners: Male   Pain with intercourse: No   History of abuse: No        Pelvic Pain: None       Past medical and surgical history    Past obstetric history   Number of vaginal deliveries: 3   Number of  deliveries: 0   History of vacuum/forceps: Yes vacuum and forceps in same delivery -> right leg nerve damage    History of obstetric anal sphincter injury: No     Past gynecological history:    Last menstrual period/Menopause: Patient's last menstrual period was 2011.   History of abnormal uterine bleeding: No    History of fibroids: No    History of endometrial polyps:  No    History of endometriosis: No    History of cervical dysplasia: No     Last pap: s/p hyst   Current contraception: s/p hyst      Past medical history:  Past Medical History:   Diagnosis Date    Bowel habit changes 04/06/2018    constipation and diarrhea intervals    Dental disorder 01/04/2011    cavities and broken teeth    Pain 01/04/2011    pelvic area, 5/10    Hepatitis A 1997    Arthritis     Hypertension     Shortness of breath     Urinary bladder disorder      Past surgical history:  Past Surgical History:   Procedure Laterality Date    RECTOCELE REPAIR  4/13/2018    Procedure: RECTOCELE REPAIR and perinoeplasty;  Surgeon: Jesus Santana M.D.;  Location: SURGERY [facility];  Service: Urology    VAGINAL HYSTERECTOMY TOTAL  1/7/2011    Performed by ANABELLE CORRALES at SURGERY SAME DAY [facility]    CYSTOSCOPY  1/7/2011    Performed by ANABELLE CORRALES at SURGERY SAME DAY [facility]    TUBAL LIGATION  1999    TONSILLECTOMY  1998    MAMMOPLASTY REDUCTION  1995    EYE SURGERY      lasik    HYSTERECTOMY, VAGINAL      still have both ovaries    CT BREAST REDUCTION       Medications:has a current medication list which includes the following prescription(s): losartan, meloxicam, ventolin hfa, ibuprofen, and cyclobenzaprine.  Allergies:Toradol and Fentanyl  Family history:  Family History   Problem Relation Age of Onset    Heart Disease Mother     Hyperlipidemia Mother     Hypertension Mother     Dementia Mother     Cancer Father         lung cancer    Lung Disease Father         copd    Heart Disease Father     Cancer Sister         lung, smoker    Diabetes Maternal Grandmother     Cancer Paternal Grandfather         leukemia     Social history: reports that she has been smoking cigarettes. She started smoking about 39 years ago. She has a 13.5 pack-year smoking history. She has never used smokeless tobacco. She reports current alcohol use of about 1.2 oz of alcohol per week. She reports that she does not use drugs.    Review of systems: A full review of systems was  "performed, and negative with the exception of want is noted above in the HPI.        Objective        BP (!) 145/96 (BP Location: Left arm, Patient Position: Sitting, BP Cuff Size: Adult)   Pulse 90   Ht 5' 4\"   Wt 195 lb 12.8 oz   LMP 01/01/2011   BMI 33.61 kg/m²     Physical Exam  Constitutional:       Appearance: Normal appearance. She is normal weight.   HENT:      Head: Normocephalic.      Nose: Nose normal.   Eyes:      Pupils: Pupils are equal, round, and reactive to light.   Cardiovascular:      Comments: Elevated BP   Pulmonary:      Effort: Pulmonary effort is normal.   Abdominal:      Palpations: Abdomen is soft.   Musculoskeletal:         General: Normal range of motion.      Cervical back: Normal range of motion.   Skin:     General: Skin is warm.   Neurological:      General: No focal deficit present.      Mental Status: She is alert.   Psychiatric:         Mood and Affect: Mood normal.        Genitourinary:    Vulva: WNL   Bulbocavernosus reflex: Intact   Anal wink reflex: Intact   Perineal sensation: WNL   Urethra: WNL   Vagina: WNL   Atrophy: None   Cough stress test: Negative    Chaperone was present throughout the physical exam.     Pelvic floor:    POP-Q:   Aa 0 Ba 0 C -6   GH 4 PB 2 TVL 8.5   Ap -3 Bp -3 D x      Prolapse stage: 2   Cervical elongation: No    Urethral tenderness: No    Bladder/ suprapubic tenderness: No    Levator tenderness: Right   Levator muscle tone: WNL   Pelvic floor contraction strength (modified Oxford scale): 3=Moderate   Pelvic floor contraction duration: Normal   Bimanual exam: surgically absent uterus and cervix    Anal resting tone: WNL   Anal squeeze tone: Decreased   Palpable anal sphincter defect: No    Granulation tissue: No    Epithelial erosions: No    Epithelial ulcerations: No    Fistula: None   Vaginal band/stricture: No     Procedure Performed: No    Chaperone was present throughout the physical exam.     Diagnostic test and records review:    Urine " dipstick: void prior to appt      Post-void residual: 30 mL, performed by Bladder Scanner    Labs: None current     Radiology: None    Procedural: None    Documentation reviewed: Home Medications    Outside records reviewed: 18 pages      Assessment & Plan     Davina Jones is a 54 y.o. P3 with IC/BPS, ABDIAS, UUI/OAB, S2POP, FI. We discussed my recommendations for further diagnosis and treatment at length today.     Assessment & Plan  Interstitial cystitis  Bladder pain may represent interstitial cystitis (IC) or painful bladder syndrome (PBS) if no other infectious/structural causses can be found.  I discussed that the exact pathophysiology of these conditions is not always known, but that most likely etiology is chronic inflammation from prior infections and/or allergies, and modulation of nerve pathways to sense pain instead of normal physiologic distension.  Bladder symptoms may not correlate with cystoscopic findings.  This is especially true for women with a prior history of endometriosis. Pain syndrome may co-exist, including pelvic pain, fibromyalgia. Concomitant pain management strategies with cognitive behavioral therapy can have significant impact. There is no cure and options for management are limited. Given this patient's symptoms and prior history I recommend:   - First Line therapy: Dietary modification forms an important baseline treatment and should be maintained despite moving onto further treatment options. I recommend that she avoid foods that may irritate the bladder. She was directed to use the “ICN food list”, available online or through a smartphone ramón, to identify food triggers to avoid. If there is no change in symptoms, she can reintroduce in the diet foods/beverages one at a time. Stress management is also recommended.   - Pt has already tried IC diet which was partially helpful due to difficulty with consistent adherence to diet   - Pt already did PFPT and bladder  instillations with minimal improvement   - Medications: She cannot afford pentosan polysulfate (requires a baseline ophthalmologic exam prior to initiating therapy due to association with maculoopathy). --She will try adding antihistamine therapy as overall all her symptoms are under control.   - Discussed alternative treatments if no further improvement are:   - Cystourethroscopy with hydrodistension: Camera is placed in the bladder (cystoscopy), fluid is then placed in the bladder under low pressure with possible Hunner ulcer fulguration (if found). This is performed as an outpatient procedure in the operating room with sedation. Risks of hematuria, infection, and possible but uncommon bladder rupture discussed.   - Botox: injections of botox into the bladder wall through a camera (cystoscopy) that may help with pain, to be scheduled under anesthesia. If this is unsuccessful will move forward with a trial of sacral neuromodulation.   - Sacral neuromodulation         ABDIAS (stress urinary incontinence, female)  She reports stress urinary incontinence (ABDIAS) symptoms. Cough stress test today was negative. The pathogenesis of ABDIAS includes genetic tendency,  aging, menopause and childbirth injuries, and is overall caused by a weakness of the pelvic support of the urethra, and thus interventions to fix this enhance the structure either via behavioral/physical therapy or through surgical intervention.  I discussed options for management which include both nonsurgical and surgical options.   - We discussed non-surgical options including pelvic floor physical therapy and pessary use.  I discussed different types of pessary that may be used for stress urinary incontinence as well as pessary care.    - Procedural interventions aimed to strengthen the urethral support or the urethral opening, and there are various techniques that are tailored to each patient's symptoms and bladder function  A midurethral sling is the gold  standard treatment for most stress urinary incontinence, especially when the urethra is hypermobile/unsupported.  This involves the placement of a safe, light weight piece of mesh under the urethra to help support it back into the normal anatomic location.  This is an outpatient vaginal surgery with same-day discharge and no need for narcotic pain medications.  While not the most painful procedure, there are restrictions on activity for 6 weeks afterwards including vigorous exercise, heavy lifting, straining, intercourse, sitting in water/swimming. Approximately 90% of patients experience of significant improvement in their leakage symptoms after sling procedure, and 60 to 70% report a complete resolution of her urinary leakage. Patients with urgency may also see improvement, but this may also persist or worsen due to different underlying causes of urinary urgency.  Approximately 20-30% of patients may require a temporary Vieyra catheter after this procedure, and 1/50 patients may need an adjustment to loosen the sling in the immediate postoperative period due to overtightening.  Mesh is considered overwhelmingly safe and has been extensively studied, but there is an approximately 1 to 3% chance long-term of a mesh complication, the majority of which are small mesh exposures which can be managed either through vaginal estrogen or excision of mesh.  Major complications are rare.   A urethral bulking procedure using Bulkamid is an alternative therapy for stress urinary incontinence. This does not use any permanent implanted materials, but employed as a safe hydrogel which allows ingrowth of the patient's own tissue to strengthen the opening of the bladder into the urethra. This is also a same-day procedure without any postoperative restrictions. While less invasive, success rates are lower when compared to the sling, with approximately 60% of patients seeing a dramatic improvement in their symptoms, 90% of patients  seeing some improvement. 1/4 patients require 2 treatments in order to get optimal therapeutic results. There is a 10 to 20% chance of needing a temporary Vieyra catheter for 1-2 days after the procedure.  At this time she is considering pessary vs surgery and would like more  time to consider options.   FU in 1 month.        Urgency incontinence  Overactive bladder  This patient has overactive bladder; She was educated on the pathophysiology of bladder urgency, and that her symptoms are likely due to overactivity of the bladder muscle and nerves. Conservative/behavioral management strategies were reviewed, including fluid management, avoidance of bladder irritants, urge strategies and Kegel's exercises. Moderate weight loss in obese patients (5-8%) can lead to significant urinary symptom improvement. Overview of pelvic floor physical therapy, biofeedback, and second-line oral medical therapy (anticholinergics, beta-3 agonists) and third-line therapies (intravesical botox injection, PTNS, sacral neuromodulation) discussed.   - She has already failed multiple medications listed above. PTNS is not a feasible option due to the far distance.   - At this time she is considering continuing PFME vs botox.   - FU in 1 month          Vaginal vault prolapse after hysterectomy  Cystocele  She has symptomatic pelvic organ prolapse, anterior predominant.  In most cases, this is not a dangerous condition that necessitates treatment in all patients, but treatment is offered when it impacts quality of life, bladder function, or bowel function.  I reviewed the clinical findings and discussed the pathogenesis extensively, including genetic tendency, aging, menopause and childbirth injuries. We discussed options for management, including both nonsurgical and surgical options.   Nonsurgical options include both expectant management, pelvic floor physical therapy to prevent progression, and pessary use. I discussed different types of  pessary as well as pessary care.   We reviewed all surgical options including both vaginal and laparoscopic reconstructive approaches, and those using native tissue (non-mesh) and mesh augmented approaches. We discussed recurrent/retreatment risk for all surgical approaches.  Native tissue (nonmesh) approaches have a retreatment rate in excess of 20% long-term, and this is reduced with mesh augmentation (approximately 5-8%).  Current forms of surgical mesh have been extensively evaluated for their safety, but there is a 1 to 5% risk long-term of mesh complications, the majority of which include mesh exposure, which was discussed with her.  At this time she is considering pessary vs surgery and would like more time.   In addition to my verbal counseling today, detailed written counseling was provided that detailed the surgical procedures, preoperative information, risks of surgery, postoperative recovery arc.  She was instructed to review these in detail prior to her next visit and to bring questions.         Incontinence of feces with fecal urgency  The patient suffers from fecal incontinence, predominantly urge. I discussed with the patient possible etiologies and treatments related to her diagnosis, including:   Diet: Instructions were given to experiment with her diet to see if certain foods worsen the situation. In particular, an excessive high fiber diet (too much bran, cereal, fruit, etc.), too much caffeine or alcohol and a lot of artificial sweeteners can worsen fecal incontinence. I advised the patient to avoid foods and supplements that have laxative properties which may exacerbate fecal incontinence symptoms.   Behavioral: Management of fecal incontinence can be helped by ensuring bowel movements at regular time(s) during the day. Correct positioning when emptying bowels and how to empty the bowels without straining was reviewed. Kegel exercises were also reviewed.   Medications for stool bulking: The  patient was also instructed on the use of Loperamide OTC, starting at a low dose (half tablet) and titrating up, holding for constipation.  Physical therapy: I offered patient pelvic floor physical therapy with or without biofeedback which can improve pelvic floor function and possibly improve incontinence symptoms.   Surgical: We discussed the full range of possible surgical interventions including sacral neuromodulation and revision of the anal sphincter.   Plan: Pt will increase fiber supplementation   FU in 1 month on symptoms.          Hypertension, unspecified type  Elevated BP today without concerning symptoms. Pt did not take medications and instructed to take them as soon as she is able. Precautions reviewed.        Medical decision making (MDM)  60 minutes total time spent on the date of the encounter:    3 min reviewing records  15 min with the history and physical exam   35 min  spent in direct patient education and counseling   2 min spent in the coordination of care  5 min spent in electronic medical record documentation in the patient's chart.      Tyra Bueno MD  Urogynecology and Reconstructive Pelvic Surgery  Department of Obstetrics and Gynecology  [facility]

## 2025-01-08 ENCOUNTER — GYNECOLOGY VISIT (OUTPATIENT)
Dept: GYNECOLOGY | Facility: CLINIC | Age: 55
End: 2025-01-08
Payer: MEDICARE

## 2025-01-08 VITALS
BODY MASS INDEX: 34.08 KG/M2 | HEART RATE: 117 BPM | DIASTOLIC BLOOD PRESSURE: 93 MMHG | SYSTOLIC BLOOD PRESSURE: 133 MMHG | WEIGHT: 199.6 LBS | HEIGHT: 64 IN

## 2025-01-08 DIAGNOSIS — N39.41 URGENCY INCONTINENCE: ICD-10-CM

## 2025-01-08 DIAGNOSIS — N32.81 OVERACTIVE BLADDER: ICD-10-CM

## 2025-01-08 DIAGNOSIS — R15.9 INCONTINENCE OF FECES WITH FECAL URGENCY: ICD-10-CM

## 2025-01-08 DIAGNOSIS — N39.3 SUI (STRESS URINARY INCONTINENCE, FEMALE): ICD-10-CM

## 2025-01-08 DIAGNOSIS — N99.3 VAGINAL VAULT PROLAPSE AFTER HYSTERECTOMY: ICD-10-CM

## 2025-01-08 DIAGNOSIS — N30.10 INTERSTITIAL CYSTITIS: ICD-10-CM

## 2025-01-08 DIAGNOSIS — N81.10 CYSTOCELE, UNSPECIFIED: ICD-10-CM

## 2025-01-08 DIAGNOSIS — R15.2 INCONTINENCE OF FECES WITH FECAL URGENCY: ICD-10-CM

## 2025-01-08 PROCEDURE — 99215 OFFICE O/P EST HI 40 MIN: CPT | Performed by: STUDENT IN AN ORGANIZED HEALTH CARE EDUCATION/TRAINING PROGRAM

## 2025-01-08 PROCEDURE — 3075F SYST BP GE 130 - 139MM HG: CPT | Performed by: STUDENT IN AN ORGANIZED HEALTH CARE EDUCATION/TRAINING PROGRAM

## 2025-01-08 PROCEDURE — 3080F DIAST BP >= 90 MM HG: CPT | Performed by: STUDENT IN AN ORGANIZED HEALTH CARE EDUCATION/TRAINING PROGRAM

## 2025-01-08 RX ORDER — ESTRADIOL 0.1 MG/G
CREAM VAGINAL
Qty: 1 EACH | Refills: 6 | Status: SHIPPED | OUTPATIENT
Start: 2025-01-08

## 2025-01-08 RX ORDER — HYDROXYZINE HYDROCHLORIDE 25 MG/1
25 TABLET, FILM COATED ORAL 3 TIMES DAILY PRN
Qty: 60 TABLET | Refills: 0 | Status: SHIPPED | OUTPATIENT
Start: 2025-01-08

## 2025-01-08 NOTE — PATIENT INSTRUCTIONS
Main Surgical Procedure:    Vaginal vault suspension - sacrospinous, iliococcygeus, uterosacral  (support top of the vagina by attaching it to ligament/muscle)       The entire procedure will be completed in a minimally invasive manner, with all incisions inside the vagina. Once you are asleep with anesthesia, a thorough exam will be performed to confirm the areas needing repair. Your surgeon will carefully dissect into the space between your vagina and other pelvic organs, and place sutures through a ligament (sacrospinous, uterosacral) or a muscle (iliococcygeus) in your buttock. These sutures will be connected to the apex (top) of your vagina, lifting it back up to a more normal position. This may be done with a small permanent suture, or sometimes, an absorbable suture.    In addition to the general surgical risks listed in the pre-operative counseling pamphlet, this procedure carries the risk of:  Buttock pain: The suture in the ligament sometimes causes an aching buttock discomfort as it heals. This is temporary, meaning it should resolve over a few weeks. Rarely, this leads to severe pain from nerve entrapment, which is repaired on the same day of surgery by removing the suture.    Hematoma (pooling of blood) in the areas behind the vagina: This due to the many small blood vessels in this area. This may require a “packing” (long piece of gauze) to be placed in the vagina to put more pressure on the bleeding areas. This will be removed a couple hours after the surgery or the next day. More rarely, a large blood clot may require another procedure to drain and repair the area.    After this procedure is complete, you will be re-examined and then proceed with the following possible procedures:  Main Surgical Procedure  Anterior repair   (fix prolapse of the vagina/bladder)       The entire procedure will be completed in a minimally invasive manner with all incisions made inside the vagina. Once you are asleep  with anesthesia, a thorough exam will be performed to confirm the areas needing repair. A cut will be made along the front wall of your vagina where the bladder is pushing down, and your skin is  from the underlying supportive tissue. This stronger tissue underneath will then be repaired using sutures that will dissolve over time.   Sometimes, excess skin is removed. The skin is then closed. A cystoscopy (camera in bladder) will be performed to confirm the repair is successful and no injury has occurred.     Specific risks for this procedure include damage to the bladder, urethra (the tube where urine comes out when you pee) or ureters (the tubes that bring urine from the kidneys to the bladder). This is uncommon. Bladder damage may result in having a catheter in the bladder for a longer amount of time.           Main Surgical Procedure:    Midurethral sling    (mesh sling under urethra to stop leakage)    The mid-urethral sling procedure will treat the problem of leakage of urine when you experience an increase in abdominal pressure, such as with coughing, sneezing, exercising, or laughing (stress incontinence). This procedure may also be performed if there is a high likelihood that you will develop new stress incontinence after a prolapse repair.    After you go to sleep, the lightweight mesh sling is placed through a small cut made in the vagina over the mid-point of the urethra. Through this cut, the two ends of the sling are passed with a special needle from the vagina, along either side of the urethra, exiting through two very small cuts made just above the pubic bone in the hairline (retropubic sling). Sometimes, the sling is passed through the groin area (trans-obturator sling). The surgeon will then use a camera (cystoscope) to check that the sling is correctly positioned and not sitting within the bladder. The sling is then adjusted so that it sits loosely underneath the urethra. The vaginal skin  is then stitched to cover the sling. The ends of the sling are cut off below the skin and the incision closed with skin glue or bandages.    This urinary incontinence mesh is not the same kind of vaginal mesh you may have heard about on television, which was removed from the market.  Your surgeon will speak to you in detail about this safer sling mesh at your visit.      Risks of this particular procedure include:  Difficulty emptying your bladder requiring a catheter is common immediately after surgery (20-50%). This usually resolves within 1 week. Most patients who can't urinate immediately after surgery will return to the office in 3-5 days to test the bladder again and remove the catheter. Longer-term difficulty passing urine may occur in 1-5% of patients. During this time your doctor may recommend a fine tube or catheter be used to drain the bladder. If your urine stream remains very slow or you cannot empty the bladder well even after the swelling has settled, your provider will discuss other possibilities, such as cutting or loosening the sling.  Sling exposure. Occasionally the sling can appear in the wall of the vagina a few weeks, months, or years after an operation. Symptoms may include vaginal bleeding, vaginal discharge, or pain with intercourse for the patient or her partner. In such cases, you should consult your surgeon for further advice. Management would involve either vaginal estrogen cream to help the skin grow over the sling, or cutting out the small part of the mesh coming through.  Rarely does the entire vaginal portion of the mesh need to be removed.  The arms of the mesh in the muscles cannot always be removed.  The risk of the mesh coming out into the vagina is 2-4%.  Bladder or urethral perforation. Bladder perforation occurs in 1-5% of procedures. Your surgeon will check for damage during the operation by looking inside the bladder and urethra using a cystoscope, a special camera placed  into the bladder. Removing and correctly locating the needle to which the sling is attached should resolve the situation. Bladder perforation, as long as it is recognized, does not affect the success of the operation. Damage to the urethra is more difficult to deal with and should be discussed with your surgeon. This is uncommon, and the sling may not be placed if this occurs.  Pain. Long-term pain following sling surgery is unusual. Studies suggest that after the operation about 1% will develop vaginal pain. In most cases pain is short lived and does not occur for more than 1 to 2 weeks. In the rare instance that the pain does not go away with physical therapy or vaginal estrogen,  the sling may need to be removed.

## 2025-01-08 NOTE — PROGRESS NOTES
Urogynecology & Reconstructive Pelvic Surgery - Follow Up Visit    Davina Jones [id number] :1970    Referred by: Dr. Garfield Giron ([facility], [place], operated by [facility])    Reason for Visit:   Chief Complaint   Patient presents with    Other     FV         Subjective     History of Presenting Illness:  Davina Jones is a 54 y.o. P3 with HTN, LBP, tobacco use (1/2PPD), ICBPS, ABDIAS, UUI/OAB, S2POP and FI here for follow up.     IC: no difference with zyrtec. Had more stress over the holidays and not adhering to IC diet so symptoms worse. Ok today.       ABDIAS: After reviewing materials and talking with her family she would like surgical management over a pessary.     POP: After reviewing materials and talking with her family she would like surgical management over pessary. She does have sacralization (fusion of L5 to sacrum/S1) and this causes her pain with prolonged sitting.     OAB/UUI: still unsure what treatment options she wants to pursue for this after multiple medications.     FI: fiber increased and has noticed firmer stools but still has some urgency and smearing.       Initial symptoms:  Interstitial cystitis - no current treatment. Did try IC diet that helped but difficult to maintain. BIs did no help and not feasible as she lives in [place]. Elmiron was too expensive. Felt like wygovy helped a lot, but the cost of this also increased and she has not taken it recently.   Typical IC sx: stabbing bladder pain, unsure if worse with full, no burning with urination, no hematuria, incomplete bladder emptying during flares, urgency, frequency. UTI tests neg. H/o kidney stones.   Triggers: stress (lives with ex), sodas     Leaks with urge trying to get to the bathroom. Also leaks cough, laugh and sneeze.  UUI bothers her more than ABDIAS.     Occasionally feels tissue at the entrance of her vagina with wiping. Feels bulge/pressure.     Fiber spoonful daily.     Has not taken her BP in a few days.  Denies chest pain, SOB, HA or vision changes.     Prior Pelvic surgery:   1999 BTL  2010 TVH, BS - pelvic pain ([facility] surgeon, not [facility])  2018 Rectocele repair ([facility], surgeon moved to [place]) - prolapse, c/b left nerve damage still have weakness going up stairs, vagina goes numbs sometime on the left   Appy     Prior treatment:   PFPT  Kegels  Oxybutynin - Developed SE (dry eye, mouth and constipation, dizziness, N)  Trospium - Developed SE (dry eye, mouth and constipation, dizziness, N)  Tolterodine - Developed SE (dry eye, mouth and constipation, dizziness, N)  Solifenacin - Developed SE (dry eye, mouth and constipation, dizziness, N)  Mirabegon - developed SE  Elmiron - too expensive   BIs - no improvement     Fluid intake:   Water: 32oz   Coffee:     Pelvic floor symptom review:     Bladder:   Voids per day: 10 Voids per night: 2      Urinary incontinence episodes per day: 3-4    Urge leakage:  On Movement to Bathroom   Stress leakage: With Cough and With Laugh   Continuous / insensible urine loss: Yes   Nocturnal enuresis: Yes   Leakage volume: Drops   Number of pads/day: 0    Bladder emptying: Incomplete sometimes    Voiding symptoms: Post-Void Dribble leans forward to void    UTI in last 12 months: 0   Other urologic history: h/o stones       Prolapse:     Prolapse symptoms: Bulge, Exteriorized Tissue, and Pelvic Pressure   Degree of prolapse: To Introitus   Duration of prolapse symptoms: 2017      Bowel:    Constipation: Yes   Bowel movements per day: q2-3d , stool quality: mush-liquid joanie with Wygovy    Straining to empty bowels: Yes   Splinting to evacuate: No    Painful evacuation: No    Difficulty emptying rectum: Yes   Incontinence to stool: Yes 1x/week with urgency, stool mush-soft    Blood in stool: No    Hemorrhoids: Yes   Bowel conditions: none   Most recent colonoscopy: 2022       Sexual function:    Sexually active: No lack of interest   Gender of partners: Male   Pain with intercourse:  No   History of abuse: No        Pelvic Pain: None       Past medical and surgical history    Past obstetric history   Number of vaginal deliveries: 3   Number of  deliveries: 0   History of vacuum/forceps: Yes vacuum and forceps in same delivery -> right leg nerve damage    History of obstetric anal sphincter injury: No     Past gynecological history:    Last menstrual period/Menopause: Patient's last menstrual period was 2011.   History of abnormal uterine bleeding: No    History of fibroids: No    History of endometrial polyps:  No    History of endometriosis: No    History of cervical dysplasia: No    Last pap: s/p hyst   Current contraception: s/p hyst      Past medical history:  Past Medical History:   Diagnosis Date    Bowel habit changes 2018    constipation and diarrhea intervals    Dental disorder 2011    cavities and broken teeth    Pain 2011    pelvic area, 5/10    Hepatitis A 1997    Arthritis     Hypertension     Shortness of breath     Urinary bladder disorder      Past surgical history:  Past Surgical History:   Procedure Laterality Date    RECTOCELE REPAIR  2018    Procedure: RECTOCELE REPAIR and perinoeplasty;  Surgeon: Jesus Santana M.D.;  Location: SURGERY [facility];  Service: Urology    VAGINAL HYSTERECTOMY TOTAL  2011    Performed by ANABELLE CORRALES at SURGERY SAME DAY [facility]    CYSTOSCOPY  2011    Performed by ANABELLE CORRALES at SURGERY SAME DAY [facility]    TUBAL LIGATION      TONSILLECTOMY      MAMMOPLASTY REDUCTION      EYE SURGERY      lasik    HYSTERECTOMY, VAGINAL      still have both ovaries    SD BREAST REDUCTION       Medications:has a current medication list which includes the following prescription(s): hydroxyzine hcl, estradiol, losartan, meloxicam, ventolin hfa, ibuprofen, and cyclobenzaprine.  Allergies:Toradol and Fentanyl  Family history:  Family History   Problem Relation Age of Onset    Heart  "Disease Mother     Hyperlipidemia Mother     Hypertension Mother     Dementia Mother     Cancer Father         lung cancer    Lung Disease Father         copd    Heart Disease Father     Cancer Sister         lung, smoker    Diabetes Maternal Grandmother     Cancer Paternal Grandfather         leukemia     Social history: reports that she has been smoking cigarettes. She started smoking about 39 years ago. She has a 13.5 pack-year smoking history. She has never used smokeless tobacco. She reports current alcohol use of about 1.2 oz of alcohol per week. She reports that she does not use drugs.    Review of systems: A full review of systems was performed, and negative with the exception of want is noted above in the HPI.        Objective        BP (!) 133/93 (BP Location: Left arm, Patient Position: Sitting, BP Cuff Size: Large adult)   Pulse (!) 117   Ht 5' 4\"   Wt 199 lb 9.6 oz   LMP 01/01/2011   BMI 34.26 kg/m²     Physical Exam  Constitutional:       Appearance: Normal appearance. She is normal weight.   HENT:      Head: Normocephalic.      Nose: Nose normal.   Eyes:      Pupils: Pupils are equal, round, and reactive to light.   Cardiovascular:      Comments: Elevated BP   Pulmonary:      Effort: Pulmonary effort is normal.   Abdominal:      Palpations: Abdomen is soft.   Musculoskeletal:         General: Normal range of motion.      Cervical back: Normal range of motion.   Skin:     General: Skin is warm.   Neurological:      General: No focal deficit present.      Mental Status: She is alert.   Psychiatric:         Mood and Affect: Mood normal.        Pelvic deferred, last 12/10/24:  Genitourinary:    Vulva: WNL   Bulbocavernosus reflex: Intact   Anal wink reflex: Intact   Perineal sensation: WNL   Urethra: WNL   Vagina: WNL   Atrophy: None   Cough stress test: Negative    Chaperone was present throughout the physical exam.     Pelvic floor:    POP-Q:   Aa 0 Ba 0 C -6   GH 4 PB 2 TVL 8.5   Ap -3 Bp -3 D x "      Prolapse stage: 2   Cervical elongation: No    Urethral tenderness: No    Bladder/ suprapubic tenderness: No    Levator tenderness: Right   Levator muscle tone: WNL   Pelvic floor contraction strength (modified Oxford scale): 3=Moderate   Pelvic floor contraction duration: Normal   Bimanual exam: surgically absent uterus and cervix    Anal resting tone: WNL   Anal squeeze tone: Decreased   Palpable anal sphincter defect: No    Granulation tissue: No    Epithelial erosions: No    Epithelial ulcerations: No    Fistula: None   Vaginal band/stricture: No     Procedure Performed: No    Chaperone was present throughout the physical exam.     Diagnostic test and records review:    Urine dipstick: void prior to appt      Post-void residual: 30 mL, performed by Bladder Scanner    Labs: None current     Radiology: None    Procedural: None    Documentation reviewed: Home Medications    Outside records reviewed: 18 + 97 pages      Assessment & Plan     Davina Jones is a 54 y.o. P3 with IC/BPS, ABDIAS, UUI/OAB, S2POP, FI. We discussed my recommendations for further diagnosis and treatment at length today.     Assessment & Plan  Interstitial cystitis  - No improvement with zyrtec. Recommend hydroxyzine TID prn for bladder pain.   - Continue IC diet and stress management.   Orders:    hydrOXYzine HCl (ATARAX) 25 MG Tab; Take 1 Tablet by mouth 3 times a day as needed (Bladder pain).    ABDIAS (stress urinary incontinence, female)  - After extensive discussion and review of all treatment options, patient would like to proceed with midurethral sling. Benefits of surgery were reviewed, including functional outcomes (bladder/bowel/sexual). Risks of surgery were  also discussed including anesthesia, bleeding, infection, damage to surrounding organs (bladder, ureter, urethra, bowel, blood vessel, nerves), possible blood transfusion (rare), persistent/recurrent incontinence, mesh exposure, pain, dyspareunia, transient  voiding dysfunction requiring catheterization, possible need for sling release/revision, new/worsening urinary incontinence.  - Had extensive discussion that due to her tobacco use, her risk of mesh exposure is increased due to vasoconstriction and poor wound healing. She acknowledges this and still would like to proceed with MUS.   - Review of UDS in 2018 from [facility] did not demonstrate leak. This was prior to her rectocele repair. Recommend repeat UDS to fully elucidate bladder function after pelvic surgery surgery. She will be scheduled for this at her preop visit.        Vaginal vault prolapse after hysterectomy  Cystocele, unspecified  - After reviewed of all treatment options and her medical history, I do not recommend mesh augmented surgery given her tobacco use and risk of poor wound healing leading to higher risk of mesh exposure and as well her sacralization (fusion of L5 to S1) and pain from this; this is near the location the mesh is secured. I recommend a vaginal vault suspension to correct her prolapse while maintaining her vaginal patency. She also agrees and opts for vaginal vault suspension, possible anterior repair, and all indicated procedures.        Urgency incontinence  Overactive bladder  She would like to address prolapse and ABDIAS first before pursuing additional OAB treatments as she has already failed multiple medications. If symptoms persist after surgery, will consider other treatment options.   - Recommend starting vaginal estrogen to help with urgency and improve tissue quality prior to surgery.   Orders:    estradiol (ESTRACE VAGINAL) 0.1 MG/GM vaginal cream; Apply 1g cream inside vagina using applicator nightly for 2 weeks, then twice per week thereafter. For refills, continue to take twice per week.    Medical decision making (MDM)  45 minutes total time spent on the date of the encounter:    5 min reviewing records  5 min with the history and physical exam   30 min  spent in  direct patient education and counseling   1 min spent in the coordination of care  4 min spent in electronic medical record documentation in the patient's chart.      Tyra Bueno MD  Urogynecology and Reconstructive Pelvic Surgery  Department of Obstetrics and Gynecology  [facility]

## 2025-03-24 ENCOUNTER — APPOINTMENT (OUTPATIENT)
Dept: ADMISSIONS | Facility: MEDICAL CENTER | Age: 55
End: 2025-03-24
Attending: STUDENT IN AN ORGANIZED HEALTH CARE EDUCATION/TRAINING PROGRAM
Payer: MEDICARE

## 2025-03-31 ENCOUNTER — PRE-ADMISSION TESTING (OUTPATIENT)
Dept: ADMISSIONS | Facility: MEDICAL CENTER | Age: 55
End: 2025-03-31
Attending: STUDENT IN AN ORGANIZED HEALTH CARE EDUCATION/TRAINING PROGRAM
Payer: MEDICARE

## 2025-03-31 RX ORDER — AMLODIPINE BESYLATE 10 MG/1
10 TABLET ORAL DAILY
COMMUNITY

## 2025-03-31 NOTE — PREADMIT AVS NOTE
Current Medications   Medication Instructions    amLODIPine (NORVASC) 10 MG Tab CONTINUE TAKING MEDICATION AS PRESCRIBED.     Multiple Vitamins-Minerals (CENTRUM SILVER PO) HOLD 7 DAYS PRIOR TO SURGERY/PROCEDURE.     Cyanocobalamin (VITAMIN B12 PO) HOLD 7 DAYS PRIOR TO SURGERY/PROCEDURE.     Multiple Vitamins-Minerals (ZINC PO) HOLD 7 DAYS PRIOR TO SURGERY/PROCEDURE.     ELDERBERRY PO HOLD 7 DAYS PRIOR TO SURGERY/PROCEDURE.     estradiol (ESTRACE VAGINAL) 0.1 MG/GM vaginal cream HOLD AM OF SURGERY/PROCEDURE.    losartan (COZAAR) 25 MG Tab HOLD 24 HOURS PRIOR TO SURGERY/PROCEDURE.     meloxicam (MOBIC) 15 MG tablet HOLD 5 DAYS PRIOR TO SURGERY, UNLESS INSTRUCTED BY SURGEON.    VENTOLIN  (90 Base) MCG/ACT Aero Soln inhalation aerosol CONTINUE TAKING MEDICATION AS PRESCRIBED.     ibuprofen (MOTRIN) 200 MG Tab HOLD 5 DAYS PRIOR TO SURGERY, UNLESS INSTRUCTED BY SURGEON.    cyclobenzaprine (FLEXERIL) 5 MG tablet CONTINUE TAKING MEDICATION AS PRESCRIBED.

## 2025-04-02 NOTE — PROCEDURES
Procedure note: Complex urodynamic testing    Procedure performed:    -     04498 Complex Uroflowmetry  51588 Complex CMG w/ voiding pressure study AND urethral pressure  89327 Complex CMG w/ voiding pressure study  38460 EMG studies anal or urethral sphincter   83952 Intraabdominal catheter       Indication: Davina Jones is a 54 y.o. year old with S2POP, PHIL, BPS, FI here for UDS and preop visit.    Bladder:              Voids per day: 10        Voids per night: 2                 Urinary incontinence episodes per day: 3-4               Urge leakage:  On Movement to Bathroom              Stress leakage: With Cough and With Laugh              Continuous / insensible urine loss: Yes              Nocturnal enuresis: Yes              Leakage volume: Drops              Number of pads/day: 0               Bladder emptying: Incomplete sometimes               Voiding symptoms: Post-Void Dribble leans forward to void               UTI in last 12 months: 0              Other urologic history: h/o stones                  Prolapse:                Prolapse symptoms: Bulge, Exteriorized Tissue, and Pelvic Pressure              Degree of prolapse: To Introitus              Duration of prolapse symptoms: 2017     CST: negative    POP-Q:   Aa 0 Ba 0 C -6   GH 4 PB 2 TVL 8.5   Ap -3 Bp -3 D x       She presents for complex urodynamic testing today to fully elucidate her bladder function and symptom pathophysiology, in order to guide further treatment, and to evaluate if an anti-incontinence procedure is indicated at her upcoming prolapse repair.     Verbal consent was obtained after review of risk and benefit.     Chaperone: Juan Pablo Pichardo MA     Procedure: The patient was taken to the urodynamic suite and placed in the urodynamic chair. She underwent sterile prep with betadine prior to catheterization. There was a negative urinalysis. Air-charged catheters were placed in the urethra/bladder and vagina. Urodynamics  were performed using routine techniques. Prolapse was reduced with a cotton scopette for stress testing. There were no complications.     Antibiotic given: none    Urodynamic findings:     Preliminary Uroflometry - machine did not record uroflow measurements during void     Flow pattern: continuous  Maximum flow: - mL/sec  Average flow: - mL/sec  Voided volume: 130 mL  Post-void residual: 9 mL  Flow time: - sec    Filling cystometrogram    First sensation: 196 mL  First desire: 301 mL  Strong Desire: 341 mL  Urodynamic capacity: 395 mL   Stress leakage: No  Uninhibited detrusor contractions present: yes  Leakage with DO: no  Leak point pressures  At 150mL volume: 163 cm H2O, no leak with cough or valsalva  At 301mL volume: 145 cm H2O, no leak  with cough or valsalva  Compliance: normal    Urethral pressure profile    Maximum urethral closing pressure (MUCP): 86 cm H2O  Morphology: normal    Pressure voiding study    The patient's voiding mechanism was accomplished by destrusor  Max flow: 18 mL/sec  Average flow: 5.1 mL/sec  Post-void residual: 72 mL  Pdet at peak flow: 56 cm H2O  Flow time: 52 sec  Pelvic floor EMG silenced during voiding: yes    Pelvic floor EMG: Normal     Assessment:     She has completed urodynamic testing, which was uncomplicated.     Filling Phase: Normal sensation. Normal compliance. +DO without DOI. Absent UDSI. Normal capacity.  Voiding Phase: Voids via detrusor. PVR normal (72). Elevated voiding pressures but normal PVR, EMG and flow; suspect secondary to anatomic obstruction from prolapse.    Plan:   See office encounter for full procedural counseling  Counseled on normal post-UDS symptoms including burning and possible hematuria. If this persists after 2 days she should call or send "[employer] message.       Tyra Bueno MD  Female Pelvic Medicine and Reconstructive Surgery  Department of Obstetrics and Gynecology  [facility]

## 2025-04-02 NOTE — PATIENT INSTRUCTIONS
Main Surgical Procedure:    Vaginal vault suspension - sacrospinous, iliococcygeus, uterosacral  (support top of the vagina by attaching it to ligament/muscle)       The entire procedure will be completed in a minimally invasive manner, with all incisions inside the vagina. Once you are asleep with anesthesia, a thorough exam will be performed to confirm the areas needing repair. Your surgeon will carefully dissect into the space between your vagina and other pelvic organs, and place sutures through a ligament (sacrospinous, uterosacral) or a muscle (iliococcygeus) in your buttock. These sutures will be connected to the apex (top) of your vagina, lifting it back up to a more normal position. This may be done with a small permanent suture, or sometimes, an absorbable suture.    In addition to the general surgical risks listed in the pre-operative counseling pamphlet, this procedure carries the risk of:  Buttock pain: The suture in the ligament sometimes causes an aching buttock discomfort as it heals. This is temporary, meaning it should resolve over a few weeks. Rarely, this leads to severe pain from nerve entrapment, which is repaired on the same day of surgery by removing the suture.    Hematoma (pooling of blood) in the areas behind the vagina: This due to the many small blood vessels in this area. This may require a “packing” (long piece of gauze) to be placed in the vagina to put more pressure on the bleeding areas. This will be removed a couple hours after the surgery or the next day. More rarely, a large blood clot may require another procedure to drain and repair the area.    After this procedure is complete, you will be re-examined and then proceed with the following possible procedures: anterior repair      Post-op: What to expect after your surgery    For less-urgent matters (Monday - Friday), you may send a message through [employer] or call the [employer] [facility] line at [phone].     For urgent  post-operative questions or concerns after hours, please call Subspecialty post op phone line [phone].    Bladder function  Try to empty your bladder (urinate) at regular intervals by sitting on the toilet and relaxing.  You may need to adjust your positioning (lean forward or back) to empty the bladder fully. It is important that you do not push or strain to empty your bladder.     Call the surgeon at the number above if you cannot urinate. Also, call for treatment if you have signs and symptoms of a urinary tract infection, including:   Burning with urination  Bladder pain  Worsening need to urinate right away  Urine with a bad smell    If you are sent home with a catheter:   Empty the catheter bag when it becomes full. The bag should be kept at a level below your hips to drain properly. When you are asleep, the bag should dangle off the bed. and should dangle off of the bed while you are asleep. You will be called the day after you go home to schedule an office visit to test your bladder and remove the catheter.     Vaginal care:   Do not go swimming, take sitting baths, or have sexual intercourse for 6 weeks after surgery. Do not place anything in the vagina except vaginal estrogen cream, if instructed to do so by your surgeon.     Vaginal discharge and bleeding/spotting is also normal through the entire 6-week recovery. Sometimes small sutures will fall out of the vagina as they dissolve. This is normal. Contact the office with any heavy bleeding soaking through pads, bad-smelling discharge, or worsening pain.     Pain management:  Surgical pain is controlled in most patients with only non-steroidal anti-inflammatory drugs (NSAIDs, such as ibuprofen, “Advil”), and acetaminophen (Tylenol). These drugs can be taken together without interaction.     In the hospital, your nurse will give you these medications at regular intervals to both treat and to prevent pain. If these are not controlling your pain, you  may ask the nurse for additional medication.     When you go home, you will also take NSAIDs and acetaminophen for pain management. I recommend the following at-home pain regimen:    Take ibuprofen 800mg three times per day, along with tylenol 1000mg three times per day, for the first 5-7 days after surgery to prevent and treat pain and inflammation.   After 5-7 days, you may take 400mg iburpfen and 500mg tylenol as-needed     Sometimes, a short course of narcotics such as oxycodone and hydromorphone is  required, but this is not routine. We do not recommend using narcotics regularly as it can lead to constipation or dizziness, and falls.     Do not drive or operate heavy machinery while using narcotics. You are unlikely to become addicted if you need to take a narcotic medication a few times within the first week of your surgery.  After the first few days to one week, your pain should decrease and you should not have pain severe enough to need narcotics. If you continue having severe pain, contact your surgeon for re-evaluation.     Bowel function  Constipation is common after surgery. This means it may take several days before having a normal bowel movement. It is important to take extra steps to keep your stools soft to avoid straining with bowel movements. Straining may damage the prolapse repair before it has healed. Most patients will be given a prescription for a stool softener (docusate) as well as a gentle laxative (Miralax or lactulose). These mediations adds water to the stool to make it easier to pass. Take them daily throughout your recovery. Hold for a day if you develop diarrhea.     Call us if you experience any repeated episodes of vomiting, worsening abdominal pain/bloating, or are unable to have a bowel movement for more than 3 days.     Activity restrictions  During the first 6 weeks avoid any type of heavy lifting that requires you to strain.  Gentle walking is good exercise. Start with about 10  minutes a day when you feel ready and build up gradually. Avoid repetitive squatting or bending at the waist. Avoid any fitness-type training, aerobics, etc. for at least 6 weeks after surgery. Generally, you will need 4-6 weeks off work. This period may be longer if you have a very physical job.    Return to sex  When your surgeon clears you to resume sex (if desired), begin slowly and to use enough lubrication to help ease the discomfort. Because your vagina and pelvis have been re-structured, and it can take time to get used to sex after surgery. As scar tissue softens over time you will feel less discomfort. If discomfort does not go away, contact the office to schedule an exam and to discuss other options. In some cases, your surgeon may prescribe a low dose of vaginal estrogen to help with vaginal dryness and pain that may happen with sex.

## 2025-04-02 NOTE — ASSESSMENT & PLAN NOTE
Discussed increased risk of poor wound healing and mesh exposure given nicotine use leading to vasoconstriction in the setting of a mesh augmented surgery (midurethral sling). She would like to defer MUS at this time. If ABDIAS worsen after prolapse surgery will consider treatment options.   Orders:    POCT Urinalysis

## 2025-04-03 ENCOUNTER — PRE-ADMISSION TESTING (OUTPATIENT)
Dept: ADMISSIONS | Facility: MEDICAL CENTER | Age: 55
End: 2025-04-03
Attending: STUDENT IN AN ORGANIZED HEALTH CARE EDUCATION/TRAINING PROGRAM
Payer: MEDICARE

## 2025-04-03 ENCOUNTER — GYNECOLOGY VISIT (OUTPATIENT)
Dept: GYNECOLOGY | Facility: CLINIC | Age: 55
End: 2025-04-03
Payer: MEDICARE

## 2025-04-03 VITALS
HEIGHT: 64 IN | WEIGHT: 209.2 LBS | BODY MASS INDEX: 35.71 KG/M2 | SYSTOLIC BLOOD PRESSURE: 123 MMHG | DIASTOLIC BLOOD PRESSURE: 76 MMHG

## 2025-04-03 DIAGNOSIS — N30.10 BLADDER PAIN SYNDROME: ICD-10-CM

## 2025-04-03 DIAGNOSIS — R15.9 INCONTINENCE OF FECES WITH FECAL URGENCY: ICD-10-CM

## 2025-04-03 DIAGNOSIS — N99.3 VAGINAL VAULT PROLAPSE AFTER HYSTERECTOMY: ICD-10-CM

## 2025-04-03 DIAGNOSIS — N39.41 URGENCY INCONTINENCE: ICD-10-CM

## 2025-04-03 DIAGNOSIS — N81.10 CYSTOCELE, UNSPECIFIED: ICD-10-CM

## 2025-04-03 DIAGNOSIS — R15.2 INCONTINENCE OF FECES WITH FECAL URGENCY: ICD-10-CM

## 2025-04-03 DIAGNOSIS — Z72.0 TOBACCO CONSUMPTION: ICD-10-CM

## 2025-04-03 DIAGNOSIS — N32.81 OVERACTIVE BLADDER: ICD-10-CM

## 2025-04-03 DIAGNOSIS — Z01.812 PRE-OPERATIVE LABORATORY EXAMINATION: ICD-10-CM

## 2025-04-03 DIAGNOSIS — N39.3 SUI (STRESS URINARY INCONTINENCE, FEMALE): ICD-10-CM

## 2025-04-03 DIAGNOSIS — Z01.810 PRE-OPERATIVE CARDIOVASCULAR EXAMINATION: ICD-10-CM

## 2025-04-03 LAB
ALBUMIN SERPL BCP-MCNC: 4 G/DL (ref 3.2–4.9)
ALBUMIN/GLOB SERPL: 1.3 G/DL
ALP SERPL-CCNC: 91 U/L (ref 30–99)
ALT SERPL-CCNC: 9 U/L (ref 2–50)
ANION GAP SERPL CALC-SCNC: 10 MMOL/L (ref 7–16)
APPEARANCE UR: NORMAL
AST SERPL-CCNC: 25 U/L (ref 12–45)
BASOPHILS # BLD AUTO: 0.3 % (ref 0–1.8)
BASOPHILS # BLD: 0.03 K/UL (ref 0–0.12)
BILIRUB SERPL-MCNC: 0.2 MG/DL (ref 0.1–1.5)
BILIRUB UR STRIP-MCNC: NEGATIVE MG/DL
BUN SERPL-MCNC: 9 MG/DL (ref 8–22)
CALCIUM ALBUM COR SERPL-MCNC: 9.4 MG/DL (ref 8.5–10.5)
CALCIUM SERPL-MCNC: 9.4 MG/DL (ref 8.5–10.5)
CHLORIDE SERPL-SCNC: 106 MMOL/L (ref 96–112)
CO2 SERPL-SCNC: 24 MMOL/L (ref 20–33)
COLOR UR AUTO: YELLOW
CREAT SERPL-MCNC: 0.63 MG/DL (ref 0.5–1.4)
EKG IMPRESSION: NORMAL
EOSINOPHIL # BLD AUTO: 0.16 K/UL (ref 0–0.51)
EOSINOPHIL NFR BLD: 1.5 % (ref 0–6.9)
ERYTHROCYTE [DISTWIDTH] IN BLOOD BY AUTOMATED COUNT: 46.1 FL (ref 35.9–50)
GFR SERPLBLD CREATININE-BSD FMLA CKD-EPI: 106 ML/MIN/1.73 M 2
GLOBULIN SER CALC-MCNC: 3.1 G/DL (ref 1.9–3.5)
GLUCOSE SERPL-MCNC: 98 MG/DL (ref 65–99)
GLUCOSE UR STRIP.AUTO-MCNC: NEGATIVE MG/DL
HCT VFR BLD AUTO: 44.7 % (ref 37–47)
HGB BLD-MCNC: 15 G/DL (ref 12–16)
IMM GRANULOCYTES # BLD AUTO: 0.04 K/UL (ref 0–0.11)
IMM GRANULOCYTES NFR BLD AUTO: 0.4 % (ref 0–0.9)
KETONES UR STRIP.AUTO-MCNC: NEGATIVE MG/DL
LEUKOCYTE ESTERASE UR QL STRIP.AUTO: NEGATIVE
LYMPHOCYTES # BLD AUTO: 3.15 K/UL (ref 1–4.8)
LYMPHOCYTES NFR BLD: 30.2 % (ref 22–41)
MCH RBC QN AUTO: 29.4 PG (ref 27–33)
MCHC RBC AUTO-ENTMCNC: 33.6 G/DL (ref 32.2–35.5)
MCV RBC AUTO: 87.6 FL (ref 81.4–97.8)
MONOCYTES # BLD AUTO: 0.59 K/UL (ref 0–0.85)
MONOCYTES NFR BLD AUTO: 5.7 % (ref 0–13.4)
NEUTROPHILS # BLD AUTO: 6.47 K/UL (ref 1.82–7.42)
NEUTROPHILS NFR BLD: 61.9 % (ref 44–72)
NITRITE UR QL STRIP.AUTO: NEGATIVE
NRBC # BLD AUTO: 0 K/UL
NRBC BLD-RTO: 0 /100 WBC (ref 0–0.2)
PH UR STRIP.AUTO: 7 [PH] (ref 5–8)
PLATELET # BLD AUTO: 265 K/UL (ref 164–446)
PMV BLD AUTO: 11.8 FL (ref 9–12.9)
POTASSIUM SERPL-SCNC: 4.3 MMOL/L (ref 3.6–5.5)
PROT SERPL-MCNC: 7.1 G/DL (ref 6–8.2)
PROT UR QL STRIP: NORMAL MG/DL
RBC # BLD AUTO: 5.1 M/UL (ref 4.2–5.4)
RBC UR QL AUTO: NEGATIVE
SODIUM SERPL-SCNC: 140 MMOL/L (ref 135–145)
SP GR UR STRIP.AUTO: 1.02
UROBILINOGEN UR STRIP-MCNC: 0.2 MG/DL
WBC # BLD AUTO: 10.4 K/UL (ref 4.8–10.8)

## 2025-04-03 PROCEDURE — 51741 ELECTRO-UROFLOWMETRY FIRST: CPT | Performed by: STUDENT IN AN ORGANIZED HEALTH CARE EDUCATION/TRAINING PROGRAM

## 2025-04-03 PROCEDURE — 85025 COMPLETE CBC W/AUTO DIFF WBC: CPT

## 2025-04-03 PROCEDURE — 36415 COLL VENOUS BLD VENIPUNCTURE: CPT

## 2025-04-03 PROCEDURE — 51729 CYSTOMETROGRAM W/VP&UP: CPT | Performed by: STUDENT IN AN ORGANIZED HEALTH CARE EDUCATION/TRAINING PROGRAM

## 2025-04-03 PROCEDURE — 81002 URINALYSIS NONAUTO W/O SCOPE: CPT | Performed by: STUDENT IN AN ORGANIZED HEALTH CARE EDUCATION/TRAINING PROGRAM

## 2025-04-03 PROCEDURE — 93005 ELECTROCARDIOGRAM TRACING: CPT | Mod: TC

## 2025-04-03 PROCEDURE — 80053 COMPREHEN METABOLIC PANEL: CPT

## 2025-04-03 PROCEDURE — 93010 ELECTROCARDIOGRAM REPORT: CPT | Performed by: INTERNAL MEDICINE

## 2025-04-03 PROCEDURE — 51797 INTRAABDOMINAL PRESSURE TEST: CPT | Performed by: STUDENT IN AN ORGANIZED HEALTH CARE EDUCATION/TRAINING PROGRAM

## 2025-04-03 PROCEDURE — 51784 ANAL/URINARY MUSCLE STUDY: CPT | Performed by: STUDENT IN AN ORGANIZED HEALTH CARE EDUCATION/TRAINING PROGRAM

## 2025-04-03 NOTE — PROGRESS NOTES
PT here for Pre Op + UDS  Surgery with  04/21/2025  PT aware to arrive 2 hours prior  [facility] # [phone]   Pharmacy Verified

## 2025-04-20 PROBLEM — N99.3 VAGINAL VAULT PROLAPSE AFTER HYSTERECTOMY: Status: ACTIVE | Noted: 2025-04-20

## 2025-04-20 NOTE — DISCHARGE INSTRUCTIONS
Post-op: What to expect after your surgery    For urgent post-operative questions or concerns, please call  After hours [facility]  at [phone].  If needed, be sure to inform the answering service that you recently had surgery.     For less-urgent matters (Monday - Friday), you may send a message through [employer] or call the general Women's Health line at [phone] [phone].     Bladder function  Try to empty your bladder (urinate) at regular intervals by sitting on the toilet and relaxing.  You may need to adjust your positioning (lean forward or back) to empty the bladder fully. It is important that you do not push or strain to empty your bladder.     Call the surgeon at the number above if you cannot urinate. Also, call for treatment if you have signs and symptoms of a urinary tract infection, including:   Burning with urination  Bladder pain  Worsening need to urinate right away  Urine with a bad smell    If you are sent home with a catheter:   Empty the catheter bag when it becomes full. The bag should be kept at a level below your hips to drain properly. When you are asleep, the bag should dangle off the bed. and should dangle off of the bed while you are asleep. You will be called the day after you go home to schedule an office visit to test your bladder and remove the catheter.     Vaginal care:   Do not go swimming, take sitting baths, or have sexual intercourse for 6 weeks after surgery. Do not place anything in the vagina except vaginal estrogen cream, if instructed to do so by your surgeon.     Vaginal discharge and bleeding/spotting is also normal through the entire 6-week recovery. Sometimes small sutures will fall out of the vagina as they dissolve. This is normal. Contact the office with any heavy bleeding soaking through pads, bad-smelling discharge, or worsening pain.     Pain management:  Surgical pain is controlled in most patients with only non-steroidal  anti-inflammatory drugs (NSAIDs, such as ibuprofen, “Advil”), and acetaminophen (Tylenol). These drugs can be taken together without interaction.     In the hospital, your nurse will give you these medications at regular intervals to both treat and to prevent pain. If these are not controlling your pain, you may ask the nurse for additional medication.     When you go home, you will also take NSAIDs and acetaminophen for pain management. I recommend the following at-home pain regimen:    Take tylenol 1000mg three times per day (every 8 hrs), for the first 5-7 days after surgery to prevent and treat pain and inflammation. You may continue to take mobic 7.5 mg every day as prescribed by your provider.   After 5-7 days, 500mg tylenol as-needed along with mobic 7.5 mg per day as needed.     Sometimes, a short course of narcotics such as oxycodone and hydromorphone is  required, but this is not routine. We do not recommend using narcotics regularly as it can lead to constipation or dizziness, and falls.     Do not drive or operate heavy machinery while using narcotics. You are unlikely to become addicted if you need to take a narcotic medication a few times within the first week of your surgery.  After the first few days to one week, your pain should decrease and you should not have pain severe enough to need narcotics. If you continue having severe pain, contact your surgeon for re-evaluation.     Bowel function  Constipation is common after surgery. This means it may take several days before having a normal bowel movement. It is important to take extra steps to keep your stools soft to avoid straining with bowel movements. Straining may damage the prolapse repair before it has healed. Most patients will be given a prescription for a stool softener (docusate) as well as a gentle laxative (Miralax or lactulose). These medications adds water to the stool to make it easier to pass. Take them daily throughout your recovery.  Hold for a day if you develop diarrhea.     Call us if you experience any repeated episodes of vomiting, worsening abdominal pain/bloating, or are unable to have a bowel movement for more than 3 days.     Activity restrictions  During the first 6 weeks avoid any type of heavy lifting that requires you to strain.  Gentle walking is good exercise. Start with about 10 minutes a day when you feel ready and build up gradually. Avoid repetitive squatting or bending at the waist. Avoid any fitness-type training, aerobics, etc. for at least 6 weeks after surgery. Generally, you will need 4-6 weeks off work. This period may be longer if you have a very physical job.    Return to sex  When your surgeon clears you to resume sex (if desired), begin slowly and to use enough lubrication to help ease the discomfort. Because your vagina and pelvis have been re-structured, and it can take time to get used to sex after surgery. As scar tissue softens over time you will feel less discomfort. If discomfort does not go away, contact the office to schedule an exam and to discuss other options. In some cases, your surgeon may prescribe a low dose of vaginal estrogen to help with vaginal dryness and pain that may happen with sex.       What to Expect Post Anesthesia    Rest and take it easy for the first 24 hours.  A responsible adult is recommended to remain with you during that time.  It is normal to feel sleepy.  We encourage you to not do anything that requires balance, judgment or coordination.    FOR 24 HOURS DO NOT:  Drive, operate machinery or run household appliances.  Drink beer or alcoholic beverages.  Make important decisions or sign legal documents.    To avoid nausea, slowly advance diet as tolerated, avoiding spicy or greasy foods for the first day.  Add more substantial food to your diet according to your provider's instructions.  Babies can be fed formula or breast milk as soon as they are hungry.  INCREASE FLUIDS AND  FIBER TO AVOID CONSTIPATION.    MILD FLU-LIKE SYMPTOMS ARE NORMAL.  YOU MAY EXPERIENCE GENERALIZED MUSCLE ACHES, THROAT IRRITATION, HEADACHE AND/OR SOME NAUSEA.    If any questions arise, call your provider Dr Bueno [phone].  If your provider is not available, please feel free to call the Surgical Center at [phone].    MEDICATIONS: Resume taking daily medication.  Take prescribed pain medication with food.  If no medication is prescribed, you may take non-aspirin pain medication if needed.  PAIN MEDICATION CAN BE VERY CONSTIPATING.  Take a stool softener or laxative such as senokot, pericolace, or milk of magnesia if needed.    Last pain medication given at Tylenol and pyridium given at 6:37am  Ok for next dose of tylenol after 12:37pm .   Ok to take ibuprofen or other NSAID anytime.     Oxycodone given at 10:05am.

## 2025-04-21 ENCOUNTER — ANESTHESIA (OUTPATIENT)
Dept: SURGERY | Facility: MEDICAL CENTER | Age: 55
End: 2025-04-21
Payer: MEDICARE

## 2025-04-21 ENCOUNTER — HOSPITAL ENCOUNTER (OUTPATIENT)
Facility: MEDICAL CENTER | Age: 55
End: 2025-04-21
Attending: STUDENT IN AN ORGANIZED HEALTH CARE EDUCATION/TRAINING PROGRAM | Admitting: STUDENT IN AN ORGANIZED HEALTH CARE EDUCATION/TRAINING PROGRAM
Payer: MEDICARE

## 2025-04-21 ENCOUNTER — ANESTHESIA EVENT (OUTPATIENT)
Dept: SURGERY | Facility: MEDICAL CENTER | Age: 55
End: 2025-04-21
Payer: MEDICARE

## 2025-04-21 VITALS
DIASTOLIC BLOOD PRESSURE: 77 MMHG | BODY MASS INDEX: 35.19 KG/M2 | OXYGEN SATURATION: 90 % | HEART RATE: 106 BPM | HEIGHT: 64 IN | WEIGHT: 206.13 LBS | RESPIRATION RATE: 17 BRPM | TEMPERATURE: 97.4 F | SYSTOLIC BLOOD PRESSURE: 130 MMHG

## 2025-04-21 PROCEDURE — 700101 HCHG RX REV CODE 250: Mod: UD | Performed by: ANESTHESIOLOGY

## 2025-04-21 PROCEDURE — 700111 HCHG RX REV CODE 636 W/ 250 OVERRIDE (IP): Mod: UD | Performed by: STUDENT IN AN ORGANIZED HEALTH CARE EDUCATION/TRAINING PROGRAM

## 2025-04-21 PROCEDURE — 160046 HCHG PACU - 1ST 60 MINS PHASE II: Performed by: STUDENT IN AN ORGANIZED HEALTH CARE EDUCATION/TRAINING PROGRAM

## 2025-04-21 PROCEDURE — 160048 HCHG OR STATISTICAL LEVEL 1-5: Performed by: STUDENT IN AN ORGANIZED HEALTH CARE EDUCATION/TRAINING PROGRAM

## 2025-04-21 PROCEDURE — 110371 HCHG SHELL REV 272: Performed by: STUDENT IN AN ORGANIZED HEALTH CARE EDUCATION/TRAINING PROGRAM

## 2025-04-21 PROCEDURE — 160009 HCHG ANES TIME/MIN: Performed by: STUDENT IN AN ORGANIZED HEALTH CARE EDUCATION/TRAINING PROGRAM

## 2025-04-21 PROCEDURE — A9270 NON-COVERED ITEM OR SERVICE: HCPCS | Mod: UD | Performed by: ANESTHESIOLOGY

## 2025-04-21 PROCEDURE — 160025 RECOVERY II MINUTES (STATS): Performed by: STUDENT IN AN ORGANIZED HEALTH CARE EDUCATION/TRAINING PROGRAM

## 2025-04-21 PROCEDURE — 160036 HCHG PACU - EA ADDL 30 MINS PHASE I: Performed by: STUDENT IN AN ORGANIZED HEALTH CARE EDUCATION/TRAINING PROGRAM

## 2025-04-21 PROCEDURE — 160039 HCHG SURGERY MINUTES - EA ADDL 1 MIN LEVEL 3: Performed by: STUDENT IN AN ORGANIZED HEALTH CARE EDUCATION/TRAINING PROGRAM

## 2025-04-21 PROCEDURE — 700111 HCHG RX REV CODE 636 W/ 250 OVERRIDE (IP): Mod: JZ,UD | Performed by: ANESTHESIOLOGY

## 2025-04-21 PROCEDURE — 160015 HCHG STAT PREOP MINUTES: Performed by: STUDENT IN AN ORGANIZED HEALTH CARE EDUCATION/TRAINING PROGRAM

## 2025-04-21 PROCEDURE — 700102 HCHG RX REV CODE 250 W/ 637 OVERRIDE(OP): Mod: UD | Performed by: STUDENT IN AN ORGANIZED HEALTH CARE EDUCATION/TRAINING PROGRAM

## 2025-04-21 PROCEDURE — 57282 COLPOPEXY EXTRAPERITONEAL: CPT | Performed by: STUDENT IN AN ORGANIZED HEALTH CARE EDUCATION/TRAINING PROGRAM

## 2025-04-21 PROCEDURE — 700105 HCHG RX REV CODE 258: Mod: UD | Performed by: STUDENT IN AN ORGANIZED HEALTH CARE EDUCATION/TRAINING PROGRAM

## 2025-04-21 PROCEDURE — 160002 HCHG RECOVERY MINUTES (STAT): Performed by: STUDENT IN AN ORGANIZED HEALTH CARE EDUCATION/TRAINING PROGRAM

## 2025-04-21 PROCEDURE — 160035 HCHG PACU - 1ST 60 MINS PHASE I: Performed by: STUDENT IN AN ORGANIZED HEALTH CARE EDUCATION/TRAINING PROGRAM

## 2025-04-21 PROCEDURE — 160028 HCHG SURGERY MINUTES - 1ST 30 MINS LEVEL 3: Performed by: STUDENT IN AN ORGANIZED HEALTH CARE EDUCATION/TRAINING PROGRAM

## 2025-04-21 PROCEDURE — 700102 HCHG RX REV CODE 250 W/ 637 OVERRIDE(OP): Mod: UD | Performed by: ANESTHESIOLOGY

## 2025-04-21 PROCEDURE — 57282 COLPOPEXY EXTRAPERITONEAL: CPT | Mod: AS | Performed by: NURSE PRACTITIONER

## 2025-04-21 PROCEDURE — A9270 NON-COVERED ITEM OR SERVICE: HCPCS | Mod: UD | Performed by: STUDENT IN AN ORGANIZED HEALTH CARE EDUCATION/TRAINING PROGRAM

## 2025-04-21 RX ORDER — DEXAMETHASONE SODIUM PHOSPHATE 4 MG/ML
INJECTION, SOLUTION INTRA-ARTICULAR; INTRALESIONAL; INTRAMUSCULAR; INTRAVENOUS; SOFT TISSUE PRN
Status: DISCONTINUED | OUTPATIENT
Start: 2025-04-21 | End: 2025-04-21 | Stop reason: SURG

## 2025-04-21 RX ORDER — IPRATROPIUM BROMIDE AND ALBUTEROL SULFATE 2.5; .5 MG/3ML; MG/3ML
3 SOLUTION RESPIRATORY (INHALATION)
Status: DISCONTINUED | OUTPATIENT
Start: 2025-04-21 | End: 2025-04-21 | Stop reason: HOSPADM

## 2025-04-21 RX ORDER — BUPIVACAINE HYDROCHLORIDE 2.5 MG/ML
INJECTION, SOLUTION EPIDURAL; INFILTRATION; INTRACAUDAL; PERINEURAL
Status: DISCONTINUED | OUTPATIENT
Start: 2025-04-21 | End: 2025-04-21 | Stop reason: HOSPADM

## 2025-04-21 RX ORDER — VASOPRESSIN 20 [USP'U]/ML
INJECTION, SOLUTION INTRAVENOUS
Status: DISCONTINUED
Start: 2025-04-21 | End: 2025-04-21 | Stop reason: HOSPADM

## 2025-04-21 RX ORDER — EPHEDRINE SULFATE 50 MG/ML
INJECTION, SOLUTION INTRAVENOUS PRN
Status: DISCONTINUED | OUTPATIENT
Start: 2025-04-21 | End: 2025-04-21 | Stop reason: SURG

## 2025-04-21 RX ORDER — MORPHINE SULFATE 4 MG/ML
1 INJECTION INTRAVENOUS
Status: DISCONTINUED | OUTPATIENT
Start: 2025-04-21 | End: 2025-04-21 | Stop reason: HOSPADM

## 2025-04-21 RX ORDER — LIDOCAINE HYDROCHLORIDE 10 MG/ML
INJECTION, SOLUTION EPIDURAL; INFILTRATION; INTRACAUDAL; PERINEURAL
Status: DISCONTINUED
Start: 2025-04-21 | End: 2025-04-21 | Stop reason: HOSPADM

## 2025-04-21 RX ORDER — SODIUM CHLORIDE, SODIUM LACTATE, POTASSIUM CHLORIDE, CALCIUM CHLORIDE 600; 310; 30; 20 MG/100ML; MG/100ML; MG/100ML; MG/100ML
INJECTION, SOLUTION INTRAVENOUS CONTINUOUS
Status: ACTIVE | OUTPATIENT
Start: 2025-04-21 | End: 2025-04-21

## 2025-04-21 RX ORDER — HYDRALAZINE HYDROCHLORIDE 20 MG/ML
5 INJECTION INTRAMUSCULAR; INTRAVENOUS
Status: DISCONTINUED | OUTPATIENT
Start: 2025-04-21 | End: 2025-04-21 | Stop reason: HOSPADM

## 2025-04-21 RX ORDER — HALOPERIDOL 5 MG/ML
1 INJECTION INTRAMUSCULAR
Status: DISCONTINUED | OUTPATIENT
Start: 2025-04-21 | End: 2025-04-21 | Stop reason: HOSPADM

## 2025-04-21 RX ORDER — METOPROLOL TARTRATE 1 MG/ML
1 INJECTION, SOLUTION INTRAVENOUS
Status: DISCONTINUED | OUTPATIENT
Start: 2025-04-21 | End: 2025-04-21 | Stop reason: HOSPADM

## 2025-04-21 RX ORDER — ROCURONIUM BROMIDE 10 MG/ML
INJECTION, SOLUTION INTRAVENOUS PRN
Status: DISCONTINUED | OUTPATIENT
Start: 2025-04-21 | End: 2025-04-21 | Stop reason: SURG

## 2025-04-21 RX ORDER — MORPHINE SULFATE 4 MG/ML
2 INJECTION INTRAVENOUS
Status: DISCONTINUED | OUTPATIENT
Start: 2025-04-21 | End: 2025-04-21 | Stop reason: HOSPADM

## 2025-04-21 RX ORDER — LABETALOL HYDROCHLORIDE 5 MG/ML
5 INJECTION, SOLUTION INTRAVENOUS
Status: DISCONTINUED | OUTPATIENT
Start: 2025-04-21 | End: 2025-04-21 | Stop reason: HOSPADM

## 2025-04-21 RX ORDER — MEPERIDINE HYDROCHLORIDE 25 MG/ML
12.5 INJECTION INTRAMUSCULAR; INTRAVENOUS; SUBCUTANEOUS
Status: DISCONTINUED | OUTPATIENT
Start: 2025-04-21 | End: 2025-04-21 | Stop reason: HOSPADM

## 2025-04-21 RX ORDER — ONDANSETRON 2 MG/ML
4 INJECTION INTRAMUSCULAR; INTRAVENOUS
Status: DISCONTINUED | OUTPATIENT
Start: 2025-04-21 | End: 2025-04-21 | Stop reason: HOSPADM

## 2025-04-21 RX ORDER — ONDANSETRON 2 MG/ML
INJECTION INTRAMUSCULAR; INTRAVENOUS PRN
Status: DISCONTINUED | OUTPATIENT
Start: 2025-04-21 | End: 2025-04-21 | Stop reason: SURG

## 2025-04-21 RX ORDER — DIPHENHYDRAMINE HYDROCHLORIDE 50 MG/ML
12.5 INJECTION, SOLUTION INTRAMUSCULAR; INTRAVENOUS
Status: DISCONTINUED | OUTPATIENT
Start: 2025-04-21 | End: 2025-04-21 | Stop reason: HOSPADM

## 2025-04-21 RX ORDER — CEFAZOLIN SODIUM 1 G/3ML
INJECTION, POWDER, FOR SOLUTION INTRAMUSCULAR; INTRAVENOUS PRN
Status: DISCONTINUED | OUTPATIENT
Start: 2025-04-21 | End: 2025-04-21 | Stop reason: SURG

## 2025-04-21 RX ORDER — PHENAZOPYRIDINE HYDROCHLORIDE 200 MG/1
200 TABLET, FILM COATED ORAL
Status: COMPLETED | OUTPATIENT
Start: 2025-04-21 | End: 2025-04-21

## 2025-04-21 RX ORDER — LIDOCAINE HYDROCHLORIDE 20 MG/ML
INJECTION, SOLUTION EPIDURAL; INFILTRATION; INTRACAUDAL; PERINEURAL PRN
Status: DISCONTINUED | OUTPATIENT
Start: 2025-04-21 | End: 2025-04-21 | Stop reason: SURG

## 2025-04-21 RX ORDER — ACETAMINOPHEN 500 MG
1000 TABLET ORAL ONCE
Status: COMPLETED | OUTPATIENT
Start: 2025-04-21 | End: 2025-04-21

## 2025-04-21 RX ORDER — EPHEDRINE SULFATE 50 MG/ML
5 INJECTION, SOLUTION INTRAVENOUS
Status: DISCONTINUED | OUTPATIENT
Start: 2025-04-21 | End: 2025-04-21 | Stop reason: HOSPADM

## 2025-04-21 RX ORDER — OXYCODONE HCL 5 MG/5 ML
10 SOLUTION, ORAL ORAL
Status: COMPLETED | OUTPATIENT
Start: 2025-04-21 | End: 2025-04-21

## 2025-04-21 RX ORDER — MIDAZOLAM HYDROCHLORIDE 1 MG/ML
INJECTION INTRAMUSCULAR; INTRAVENOUS PRN
Status: DISCONTINUED | OUTPATIENT
Start: 2025-04-21 | End: 2025-04-21 | Stop reason: SURG

## 2025-04-21 RX ORDER — OXYCODONE HCL 5 MG/5 ML
5 SOLUTION, ORAL ORAL
Status: COMPLETED | OUTPATIENT
Start: 2025-04-21 | End: 2025-04-21

## 2025-04-21 RX ORDER — BUPIVACAINE HYDROCHLORIDE 2.5 MG/ML
INJECTION, SOLUTION EPIDURAL; INFILTRATION; INTRACAUDAL; PERINEURAL
Status: DISCONTINUED
Start: 2025-04-21 | End: 2025-04-21 | Stop reason: HOSPADM

## 2025-04-21 RX ORDER — GABAPENTIN 100 MG/1
200 CAPSULE ORAL
Status: COMPLETED | OUTPATIENT
Start: 2025-04-21 | End: 2025-04-21

## 2025-04-21 RX ORDER — HYDROMORPHONE HYDROCHLORIDE 2 MG/ML
INJECTION, SOLUTION INTRAMUSCULAR; INTRAVENOUS; SUBCUTANEOUS PRN
Status: DISCONTINUED | OUTPATIENT
Start: 2025-04-21 | End: 2025-04-21 | Stop reason: SURG

## 2025-04-21 RX ADMIN — HYDROMORPHONE HYDROCHLORIDE 1 MG: 2 INJECTION INTRAMUSCULAR; INTRAVENOUS; SUBCUTANEOUS at 07:33

## 2025-04-21 RX ADMIN — ACETAMINOPHEN 1000 MG: 500 TABLET ORAL at 06:37

## 2025-04-21 RX ADMIN — MIDAZOLAM HYDROCHLORIDE 2 MG: 1 INJECTION, SOLUTION INTRAMUSCULAR; INTRAVENOUS at 07:30

## 2025-04-21 RX ADMIN — IPRATROPIUM BROMIDE AND ALBUTEROL SULFATE 3 ML: .5; 2.5 SOLUTION RESPIRATORY (INHALATION) at 09:41

## 2025-04-21 RX ADMIN — SODIUM CHLORIDE, POTASSIUM CHLORIDE, SODIUM LACTATE AND CALCIUM CHLORIDE: 600; 310; 30; 20 INJECTION, SOLUTION INTRAVENOUS at 06:50

## 2025-04-21 RX ADMIN — GABAPENTIN 200 MG: 100 CAPSULE ORAL at 06:37

## 2025-04-21 RX ADMIN — PROPOFOL 200 MG: 10 INJECTION, EMULSION INTRAVENOUS at 07:34

## 2025-04-21 RX ADMIN — PHENAZOPYRIDINE 200 MG: 200 TABLET ORAL at 06:37

## 2025-04-21 RX ADMIN — ROCURONIUM BROMIDE 50 MG: 10 INJECTION INTRAVENOUS at 07:34

## 2025-04-21 RX ADMIN — ONDANSETRON 4 MG: 2 INJECTION INTRAMUSCULAR; INTRAVENOUS at 08:46

## 2025-04-21 RX ADMIN — LIDOCAINE HYDROCHLORIDE 100 MG: 20 INJECTION, SOLUTION EPIDURAL; INFILTRATION; INTRACAUDAL; PERINEURAL at 07:34

## 2025-04-21 RX ADMIN — OXYCODONE HYDROCHLORIDE 5 MG: 5 SOLUTION ORAL at 10:05

## 2025-04-21 RX ADMIN — SUGAMMADEX 200 MG: 100 INJECTION, SOLUTION INTRAVENOUS at 08:46

## 2025-04-21 RX ADMIN — CEFAZOLIN 2 G: 1 INJECTION, POWDER, FOR SOLUTION INTRAMUSCULAR; INTRAVENOUS at 07:37

## 2025-04-21 RX ADMIN — DEXAMETHASONE SODIUM PHOSPHATE 8 MG: 4 INJECTION INTRA-ARTICULAR; INTRALESIONAL; INTRAMUSCULAR; INTRAVENOUS; SOFT TISSUE at 07:44

## 2025-04-21 RX ADMIN — MORPHINE SULFATE 1 MG: 4 INJECTION, SOLUTION INTRAMUSCULAR; INTRAVENOUS at 10:05

## 2025-04-21 RX ADMIN — EPHEDRINE SULFATE 25 MG: 50 INJECTION, SOLUTION INTRAVENOUS at 08:13

## 2025-04-21 ASSESSMENT — FIBROSIS 4 INDEX: FIB4 SCORE: 1.666666666666666667

## 2025-04-21 ASSESSMENT — PAIN DESCRIPTION - PAIN TYPE: TYPE: SURGICAL PAIN

## 2025-04-21 ASSESSMENT — PAIN SCALES - GENERAL: PAIN_LEVEL: 0

## 2025-04-21 NOTE — ANESTHESIA PREPROCEDURE EVALUATION
Case: [id number] Date/Time: 04/21/25 0715    Procedures:       EXTRAPERITONEAL VAGINAL COLPOPEXY-SACROSPINOUS LIGAMENT, ANTERIOR REPAIR, POSTERIOR REPAIR, PERINEORRHAPHY, MID URETHRAL SLING, CYTOURETHROSCOPY, POSSIBLE FULGURATION OF BENJI LESIONS      BLADDER SLING, FEMALE    Pre-op diagnosis: VAGINAL VAULT PROLAPSE, CYSTOCELE MIDLINE, STRESS URINARY INCONTINENCE, OVERACTIVE BLADDER, URGE URINARY INCONTINENCE, INTERSTITIAL CYSTITIS    Location: [facility] ROOM 21 / SURGERY SAME DAY [facility]    Surgeons: Tyra Bueno M.D.            Relevant Problems   PULMONARY   (positive) Shortness of breath         (positive) Renal cyst       Physical Exam    Airway   Mallampati: II  TM distance: >3 FB  Neck ROM: full       Cardiovascular - normal exam  Rhythm: regular  Rate: normal  (-) murmur     Dental - normal exam  (+) upper dentures      Very poor dentition     Pulmonary - normal exam  Breath sounds clear to auscultation     Abdominal    Neurological - normal exam                   Anesthesia Plan    ASA 2       Plan - general       Airway plan will be ETT          Induction: intravenous    Postoperative Plan: Postoperative administration of opioids is intended.    Pertinent diagnostic labs and testing reviewed    Informed Consent:    Anesthetic plan and risks discussed with patient.    Use of blood products discussed with: patient whom consented to blood products.

## 2025-04-21 NOTE — ANESTHESIA PROCEDURE NOTES
Airway    Date/Time: 4/21/2025 7:36 AM    Performed by: Jt Leiva M.D.  Authorized by: Jt Leiva M.D.    Location:  OR  Urgency:  Elective  Difficult Airway: No    Indications for Airway Management:  Anesthesia      Spontaneous Ventilation: absent    Sedation Level:  Deep  Preoxygenated: Yes    Patient Position:  Sniffing  Mask Difficulty Assessment:  1 - vent by mask  Final Airway Type:  Endotracheal airway  Final Endotracheal Airway:  ETT  Cuffed: Yes    Technique Used for Successful ETT Placement:  Direct laryngoscopy    Insertion Site:  Oral  Blade Type:  [place]  Laryngoscope Blade/Videolaryngoscope Blade Size:  2  ETT Size (mm):  7.5  Measured from:  Teeth  ETT to Teeth (cm):  22  Placement Verified by: auscultation and capnometry    Cormack-Lehane Classification:  Grade I - full view of glottis  Number of Attempts at Approach:  1

## 2025-04-21 NOTE — OR NURSING
0855 -Pt arrived from OR, report received. Connected to monitor, VSS. On 8L mask. Pt awake.   Peripad in place no bleeding noted. Vieyra catheter in place, draining urine.    0859 pt tolerating PO fluids.     0903 called pt spouse William to update on recovery.   Pt states pain is tolerable, declines pain medication at this time.     0917  to bedside.       0943 breathing treatment started for shortness of breath      1005 pt medicated for pain per MAR.      1052 backfilled bladder per order. Pt tolerated 270 mL    1100 able to void 300 ml      1117 -d/c instructions discussed, all questions answered.  pt meets discharge criteria and states readiness to d/c home. pt dressing with assistance, IV removed, and d/c to home with family. Escorted out by staff. All belongings sent with pt.

## 2025-04-21 NOTE — ANESTHESIA POSTPROCEDURE EVALUATION
Patient: Davina Jones    Procedure Summary       Date: 04/21/25 Room / Location: [facility] ROOM 21 / SURGERY SAME DAY [facility]    Anesthesia Start: 0728 Anesthesia Stop: 0858    Procedure: EXTRAPERITONEAL VAGINALCOLPOPEXY- SACROSPINOUS, CYTOURETHROSCOPY (Vagina ) Diagnosis: (VAGINAL VAULT PROLAPSE, CYSTOCELE MIDLINE, STRESS URINARY INCONTINENCE, OVERACTIVE BLADDER, URGE URINARY INCONTINENCE, INTERSTITIAL CYSTITIS)    Surgeons: Tyra Bueno M.D. Responsible Provider: Jt Leiva M.D.    Anesthesia Type: general ASA Status: 2            Final Anesthesia Type: general  Last vitals  BP   Blood Pressure: (!) 141/83    Temp   36.3 °C (97.4 °F)    Pulse   (!) 110   Resp   16    SpO2   98 %      Anesthesia Post Evaluation    Patient location during evaluation: PACU  Patient participation: complete - patient participated  Level of consciousness: awake and alert  Pain score: 0    Airway patency: patent  Anesthetic complications: no  Cardiovascular status: hemodynamically stable  Respiratory status: acceptable  Hydration status: euvolemic    PONV: none          There were no known notable events for this encounter.     Nurse Pain Score: 0 (NPRS)

## 2025-04-21 NOTE — ANESTHESIA TIME REPORT
Anesthesia Start and Stop Event Times       Date Time Event    4/21/2025 0721 Ready for Procedure     0728 Anesthesia Start     0858 Anesthesia Stop          Responsible Staff  04/21/25      Name Role Begin End    Jt Leiva M.D. Anesth 0728 0858          Overtime Reason:  no overtime (within assigned shift)    Comments:

## 2025-04-21 NOTE — OP REPORT
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 Bimanual exam with surgically absent uterus and cervix, and no palpable adnexal masses. At the completion of the procedure there was excellent tricompartmental support, no sutures were palpated rectally.    - Cystourethroscopy: Performed after completion of relevant procedures. Normal appearing urothelium without lesions, masses, sutures, or perforations. Ureteral orifices noted in the normal orthotopic position, with brisk jets of urine bilaterally. Urethra was normal in appearance without evidence of stricture, perforation, or diverticulum.      ESTIMATED BLOOD LOSS: 25mL            DRAINS: Vieyra                   SPECIMENS: * No specimens in log *             IMPLANTS: * No surgical log found *             COMPLICATIONS: None            DISPOSITION: Discharge            CONDITION: Stable            INDICATION FOR SURGERY:   Ms. Forte is a 54 year old with bothersome Stage 2 pelvic organ prolapse, overactive bladder, and rare ABDIAS . She was counseled on all approaches to prolapse treatment including observation, pessary and surgery. She was  counseled on all methods of surgical prolapse repair including vaginal and abdominal/laparoscopic reconstructive approaches, and obliterative approaches. She opts for vaginal vault suspension, possible anterior colporrhaphy, possible posterior colporrhaphy, perineal repair, cystourethroscopy and any indicated procedures . Pre-operative urodynamics did not  demonstrated leak, and she is rarely bothered by ABDIAS thus she was counseled against a concomitant midurethral sling. She was counseled on all risks including bleeding, infection, damage to surrounding organs including bladder, urethra, ureters, bowel, blood vessels and nerves, possible laparotomy or subsequent surgery, as well as risk of recurrent prolapse, new urinary incontinence, transient urinary retention requiring bernardo catheterization, pain with intercourse, urogenital fistula, and recurrent prolapse. Both printed and verbal counseling were provided. All questions were answered and consent was signed and witnessed.       TECHNIQUE:    I spoke with the patient in the preoperative holding area, where again risks, benefits, indications, and alternatives were reviewed and informed consent was obtained.  She was then transferred to the operative suite, where she was identified, procedure was confirmed.  She was placed in dorsal supine position, given general endotracheal anesthesia without difficulty.  She was then placed in a high dorsal lithotomy position  in yellowfin stirrups with all pressure points padded.  She was prepared and draped in usual sterile fashion with vaginal chlorhexidine prep with all pressure points padded.  An appropriate time-out was performed, where patient was identified, procedure was confirmed, and the operative team was introduced.  It was also confirmed that patient did receive cefazolin 2 g IV for prophylaxis, and she also received DVT prophylaxis with sequential compression devices bilaterally throughout the case.  At this time,  exam under anesthesia revealed findings noted above.   A 16-Yi Vieyra catheter was then placed in the patient's  bladder for drainage throughout the procedure, and a LoneStar retractor was placed for retraction.     The sacrospinous vault suspension then proceed in the following fashion: Exam under anesthesia was performed and the sacrospinous ligament was palpated on both the right and the left-hand side.  An Allis clamp was then placed at the area of optimal suspension at the apex in order to reduce prolapse maximally with minimal tension.  The  right-hand side was chosen for dissection.  At this time, the area was infiltrated with a solution of dilute vasopressin and a 2.5 cm incision was made in the apical vaginal epithelium.  A dissection plane was created to retroperitoneally between the vaginal epithelium and the rectovaginal fibromuscular layer was continued in the paravaginal and eventually pararectal space down both bluntly and sharply down to the level of the right ischial spine.  Using careful blunt dissection with the surgeons right index finger the sacrospinous ligament was cleared of all surrounding tissue.  A zerved suture passing device was then brought into the operative field and a 0 Monodek PDS suture was then passed through the sacrospinous ligament approximately 2 fingerbreadths medial to the ischial spine.  Second Monodek PDS suture was placed in a similar fashion just laterally to the previously placed suture.  It was confirmed by palpation that the sutures were far away from vital structures near the ischial spine.  Excellent hemostasis at this perirectal tunnel was noted and the area was irrigated.  Both ends of each sacrospinous suture and were then passed from the retroperitoneal space through the previously tagged vaginal apex, on either side of the incision, and into the vaginal canal and held.  The apical epithelium was then closed using 2-0 Vicryl suture with care not to include the  sacrospinous sutures.     With elevation of the vaginal apex to the sacrospinous ligament, the anterior wall was corrected and the decision was made to not proceed with an anterior repair. The sacrospinous sutures were then tied down with care to avoid suture bridging, with excellent elevation of the vaginal apex ot the sacrospinous ligament. These sutures were then cut.    Cystourethroscopy was then performed.  The Vieyra catheter was removed and a 70 degree cystoscope was inserted into the bladder which was backfilled with sterile water.  360 degree survey was performed with the above findings noted.  Bilateral ureteral efflux with Pyridium stained urine was noted.  The bladder was then drained of all cystoscopic fluid and Vieyra catheter was replaced.    Attention was then turned posteriorly.  She was noted to now have a normal genital hiatus and is still sexually active. The perineum had good length and support, therefore the decision was made to not proceed with a perineal repair.  Rectal exam confirmed no sutures in the rectum. Vaginal sweep was negative. There was a small area of oozing from the right superior Lonestar stay hook. Hemostasis was achieved using an interrupted 2-0 vicryl suture. Hemostasis was noted at the completion of the procedure.    The patient was returned to the dorsal supine position, awakened from general anesthesia. She tolerated the procedure well. Sponge, lap and needle counts were correct x2. Two grams of Ancef were given prior to skin incision. The patient was awakened, taken to recovery room in stable condition.      Tyra Bueno MD  Female Pelvic Medicine and Reconstructive Surgery  Department of Obstetrics and Gynecology  [facility]  [facility]

## 2025-04-21 NOTE — ASSESSMENT & PLAN NOTE
Cystocele  ABDIAS  At her prior visit as well as today we reviewed all treatment options for prolapse including conservative/observation, pessary, and both vaginal and laparoscopic approaches as well as native tissue and mesh augmented approaches. After detailed counseling about all prolapse treatment options and shared decision-making, she opts for a native tissue vaginal reconstructive approach to prolapse repair via vaginal vault suspension, possible anterior/posterior repair, perineal repair cystourethroscopy and any indicated procedures.  She has reviewed all preoperative written materials and expressed understanding about the procedure, risks, benefits, and recovery.    Pre-operative urodynamics did not demonstrate stress urinary incontinence with reduction of prolapse and she was counseled against a concomitant midurethral sling and she had minimal ABDIAS at baseline and has increased risk of mesh complications in the setting of tobacco use.     Benefits of surgery were reviewed, including functional outcomes (bladder/bowel/sexual). Risks of surgery were  also discussed including anesthesia, bleeding, infection, damage to surrounding organs (bladder, ureter, urethra, bowel, blood vessel, nerves), possible blood transfusion, recurrent prolapse, transient buttock pain if sacrospinous suspension performed, transient voiding dysfunction requiring catheterization, new/worsening urinary incontinence, pain with sex.     Specifically she was counselled as to what to expect on the day of surgery in the holding area, counseled that medical students may be involved in her care. She will likely go home on the same day as the surgery. She was counseled on what to expect in the recovery room including voiding trial and possible vaginal packing removal, as well as what to expect if voiding trial is unsuccessful, which would be transient catheterization. Discussed trajectory of recovery, including maximizing NSAIDs and Tylenol,  and that narcotics are not routinely given. Discussed restrictions including heavy lifting that requires straining, driving while on narcotics, nothing in the vagina and no bathing/swimming for at least 6 weeks until evaluated in the office. Return to work discussed.  *Please see the corresponding after visit summary counseling packet for detailed counseling provided for the patient.   Labs:eGFR 106, Cr 0.63, H/H wnl   Pre-op meds: acetaminophen 1000mg PO, phenazopyridine 200mg PO, Cefazolin 2gm IV   Post-op medication plan: ibuprofen, tylenol, miralax   Home medication review: She should additionally hold all NSAIDs and supplements for 1 week prior to surgery.   Orders:    POCT Urinalysis

## 2025-04-22 ENCOUNTER — TELEPHONE (OUTPATIENT)
Dept: GYNECOLOGY | Facility: CLINIC | Age: 55
End: 2025-04-22
Payer: MEDICARE

## 2025-04-22 NOTE — TELEPHONE ENCOUNTER
Post-operative phone call    I called Ms. Jones and confirmed her identity. She is POD 1 s/p Extraperitoneal vaginal vault suspension - sacrospinous ligament. Cystourethroscopy.     She reports doing well, but pain is not well controlled. She rates it at a 7-8/10. She has not taken any pain medication and patient is advised to take ibuprofen 800 mg tid along with tylenol 500 mg 2 tabs tid to help with acute post operative pain. She is tolerating a regular diet and reports she had dinner last night and is trying to have some food this morning. She is able to keep well hydrated but has had some nausea and vomiting last night and around 2 AM today. The symptoms have somewhat subsided but is still having some nausea. Advised patient this is likely secondary to anesthesia and pain. She declines Zofran and states she has a Rx of it and will use it if needed. She is passing gas and had a bowel movement this morning. She is using Miralax and fiber. She is emptying her bladder well. She denies fever, chills, SOB, or heavy vaginal bleeding.   All questions answered. Advised patient to reach out via [employer] or call the office at [phone] should she have any questions or concerns prior to f/u.     She will follow up as scheduled on 6/3/25 with Dr. Bueno, or earlier if any issues arise.      ARCHIE Lucio, LISY

## 2025-06-03 ENCOUNTER — GYNECOLOGY VISIT (OUTPATIENT)
Dept: GYNECOLOGY | Facility: CLINIC | Age: 55
End: 2025-06-03
Payer: MEDICARE

## 2025-06-03 VITALS
DIASTOLIC BLOOD PRESSURE: 89 MMHG | WEIGHT: 207.2 LBS | SYSTOLIC BLOOD PRESSURE: 149 MMHG | HEIGHT: 64 IN | BODY MASS INDEX: 35.37 KG/M2

## 2025-06-03 DIAGNOSIS — N39.3 SUI (STRESS URINARY INCONTINENCE, FEMALE): ICD-10-CM

## 2025-06-03 DIAGNOSIS — N39.41 URGENCY INCONTINENCE: ICD-10-CM

## 2025-06-03 DIAGNOSIS — N32.81 OVERACTIVE BLADDER: ICD-10-CM

## 2025-06-03 DIAGNOSIS — N99.3 VAGINAL VAULT PROLAPSE AFTER HYSTERECTOMY: ICD-10-CM

## 2025-06-03 DIAGNOSIS — N81.10 CYSTOCELE, UNSPECIFIED: Primary | ICD-10-CM

## 2025-06-03 PROCEDURE — 99024 POSTOP FOLLOW-UP VISIT: CPT | Performed by: STUDENT IN AN ORGANIZED HEALTH CARE EDUCATION/TRAINING PROGRAM

## 2025-06-03 PROCEDURE — 3079F DIAST BP 80-89 MM HG: CPT | Performed by: STUDENT IN AN ORGANIZED HEALTH CARE EDUCATION/TRAINING PROGRAM

## 2025-06-03 PROCEDURE — 3077F SYST BP >= 140 MM HG: CPT | Performed by: STUDENT IN AN ORGANIZED HEALTH CARE EDUCATION/TRAINING PROGRAM

## 2025-06-03 ASSESSMENT — FIBROSIS 4 INDEX: FIB4 SCORE: 1.666666666666666667

## 2025-06-03 NOTE — ASSESSMENT & PLAN NOTE
- Resolved s/p SSLF.  - Intermittent pain may be related to healing/scarring. No pain today on exam. No systemic or exam findings concerning for infection. Continue to monitor. If persistent or worsens she will return to clinic  - Able to return to all activities without restrictions  - RFs for recurrence reviewed  - FU prn

## 2025-06-03 NOTE — PROGRESS NOTES
Urogynecology & Reconstructive Pelvic Surgery - Postop Visit    Davina Jones MRN:[id number] :1970    Referred by: Dr. Garfield Giron ([facility])    Reason for Visit:   Chief Complaint   Patient presents with    Post-op         Subjective     History of Presenting Illness:  Davina Jones is a 54 y.o. P3 with HTN, LBP, tobacco use (1/2PPD), FBPS, ABDIAS, UUI/OAB, S2POP and FI here for postop visit s/p SSLF, cysto on 25.    Overall doing well. No bulge, bleeding or abnormal discharge. Sometimes she has random sharp pain in her right pelvic area but above her vagina. Does not radiate and goes away on its own. No leaking of urine or difficulty with voiding. No longer has much urgency.     Initial symptoms:   Interstitial cystitis - no current treatment. Did try IC diet that helped but difficult to maintain. BIs did no help and not feasible as she lives in [place]. Elmiron was too expensive. Felt like wygovy helped a lot, but the cost of this also increased and she has not taken it recently.   Typical IC sx: stabbing bladder pain, unsure if worse with full, no burning with urination, no hematuria, incomplete bladder emptying during flares, urgency, frequency. UTI tests neg. H/o kidney stones.   Triggers: stress (lives with ex), sodas     Leaks with urge trying to get to the bathroom. Also leaks cough, laugh and sneeze.  UUI bothers her more than ABDIAS.     Occasionally feels tissue at the entrance of her vagina with wiping. Feels bulge/pressure.     Fiber spoonful daily.     Has not taken her BP in a few days. Denies chest pain, SOB, HA or vision changes.     Prior Pelvic surgery:    BTL   TVH, BS - pelvic pain ([facility] surgeon, not [facility])  2018 Rectocele repair ([facility] [place], surgeon moved to [place]) - prolapse, c/b left nerve damage still have weakness going up stairs, vagina goes numbs sometime on the left   Appy     Prior treatment:   PFPT  Kegels  Oxybutynin - Developed SE  (dry eye, mouth and constipation, dizziness, N)  Trospium - Developed SE (dry eye, mouth and constipation, dizziness, N)  Tolterodine - Developed SE (dry eye, mouth and constipation, dizziness, N)  Solifenacin - Developed SE (dry eye, mouth and constipation, dizziness, N)  Mirabegon - developed SE  Elmiron - too expensive   BIs - no improvement   Zrytec - no improvement   Hydroxyzine - too drowsy, no improvement    Vaginal E      Fluid intake:   Water: 32oz   Coffee:     Pelvic floor symptom review:     Bladder:   Voids per day: 10 Voids per night: 2      Urinary incontinence episodes per day: 3-4    Urge leakage:  On Movement to Bathroom   Stress leakage: With Cough and With Laugh   Continuous / insensible urine loss: Yes   Nocturnal enuresis: Yes   Leakage volume: Drops   Number of pads/day: 0    Bladder emptying: Incomplete sometimes    Voiding symptoms: Post-Void Dribble leans forward to void    UTI in last 12 months: 0   Other urologic history: h/o stones       Prolapse:     Prolapse symptoms: Bulge, Exteriorized Tissue, and Pelvic Pressure   Degree of prolapse: To Introitus   Duration of prolapse symptoms: 2017      Bowel:    Constipation: Yes   Bowel movements per day: q2-3d , stool quality: mush-liquid joanie with Wygovy    Straining to empty bowels: Yes   Splinting to evacuate: No    Painful evacuation: No    Difficulty emptying rectum: Yes   Incontinence to stool: Yes 1x/week with urgency, stool mush-soft    Blood in stool: No    Hemorrhoids: Yes   Bowel conditions: none   Most recent colonoscopy:        Sexual function:    Sexually active: No lack of interest   Gender of partners: Male   Pain with intercourse: No   History of abuse: No        Pelvic Pain: None       Past medical and surgical history    Past obstetric history   Number of vaginal deliveries: 3   Number of  deliveries: 0   History of vacuum/forceps: Yes vacuum and forceps in same delivery -> right leg nerve damage    History of  "obstetric anal sphincter injury: No     Past gynecological history:    Last menstrual period/Menopause: Patient's last menstrual period was 01/01/2011.   History of abnormal uterine bleeding: No    History of fibroids: No    History of endometrial polyps:  No    History of endometriosis: No    History of cervical dysplasia: No    Last pap: s/p hyst   Current contraception: s/p hyst      Past medical history:  Past Medical History:   Diagnosis Date    Bowel habit changes 04/06/2018    constipation and diarrhea intervals    Dental disorder 01/04/2011    cavities and broken teeth    Pain 01/04/2011    pelvic area, 5/10    Hepatitis A 1997    Anesthesia     Patient states she \"freaks out\" after surgeries.    Arthritis     Bronchitis     Gynecological disorder     Hypertension     Shortness of breath     Snoring     Urinary bladder disorder     Urinary incontinence      Past surgical history:  Past Surgical History:   Procedure Laterality Date    CT REPR VAGINAL PROLAPSE,SACROSP LIG  4/21/2025    Procedure: EXTRAPERITONEAL VAGINALCOLPOPEXY- SACROSPINOUS, CYTOURETHROSCOPY;  Surgeon: Tyra Bueno M.D.;  Location: SURGERY SAME DAY [facility];  Service: Gynecology    RECTOCELE REPAIR  4/13/2018    Procedure: RECTOCELE REPAIR and perinoeplasty;  Surgeon: Jesus Santana M.D.;  Location: SURGERY [facility];  Service: Urology    VAGINAL HYSTERECTOMY TOTAL  1/7/2011    Performed by ANABELLE CORRALES at SURGERY SAME DAY [facility]    CYSTOSCOPY  1/7/2011    Performed by ANABELLE CORRALES at SURGERY SAME DAY [facility]    TUBAL LIGATION  1999    TONSILLECTOMY  1998    MAMMOPLASTY REDUCTION  1995    EYE SURGERY      lasik    HYSTERECTOMY, VAGINAL      still have both ovaries    CT BREAST REDUCTION       Medications:has a current medication list which includes the following prescription(s): amlodipine, multiple vitamins-minerals, cyanocobalamin, multiple vitamins-minerals, elderberry, estradiol, losartan, meloxicam, ventolin " "hfa, and cyclobenzaprine.  Allergies:Toradol and Fentanyl  Family history:  Family History   Problem Relation Age of Onset    Heart Disease Mother     Hyperlipidemia Mother     Hypertension Mother     Dementia Mother     Cancer Father         lung cancer    Lung Disease Father         copd    Heart Disease Father     Cancer Sister         lung, smoker    Diabetes Maternal Grandmother     Cancer Paternal Grandfather         leukemia     Social history: reports that she has been smoking cigarettes. She started smoking about 39 years ago. She has a 13.5 pack-year smoking history. She has never used smokeless tobacco. She reports that she does not currently use alcohol. She reports that she does not use drugs.    Review of systems: A full review of systems was performed, and negative with the exception of want is noted above in the HPI.        Objective        BP (!) 149/89 (BP Location: Right arm, Patient Position: Sitting, BP Cuff Size: Adult)   Ht 5' 4\"   Wt 207 lb 3.2 oz   LMP 01/01/2011   BMI 35.57 kg/m²     Physical Exam  Constitutional:       Appearance: Normal appearance. She is normal weight.   HENT:      Head: Normocephalic.      Nose: Nose normal.   Eyes:      Pupils: Pupils are equal, round, and reactive to light.   Cardiovascular:      Comments: Elevated BP   Pulmonary:      Effort: Pulmonary effort is normal.   Abdominal:      Palpations: Abdomen is soft.   Musculoskeletal:         General: Normal range of motion.      Cervical back: Normal range of motion.   Skin:     General: Skin is warm.   Neurological:      General: No focal deficit present.      Mental Status: She is alert.   Psychiatric:         Mood and Affect: Mood normal.        Genitourinary:    Vulva: WNL   Bulbocavernosus reflex: Intact   Anal wink reflex: Intact   Perineal sensation: WNL   Urethra: WNL   Vagina: WNL well supported. SSLF sutures in place in right apex. No pain on palpation, unable to recreate pain she experiences. "    Atrophy: None   Cough stress test: Negative    Procedure Performed: No    Chaperone was present throughout the physical exam.     Diagnostic test and records review:    Urine dipstick: void prior to appt      Post-void residual: 30 mL, performed by Bladder Scanner    Labs: None current     Radiology: None    Procedural:   04/02/25 UDS:  Filling Phase: Normal sensation. Normal compliance. +DO without DOI. Absent UDSI. Normal capacity.  Voiding Phase: Voids via detrusor. PVR normal (72). Elevated voiding pressures but normal PVR, EMG and flow; suspect secondary to anatomic obstruction from prolapse.    Documentation reviewed: Home Medications    Outside records reviewed: 18 + 97 pages      Assessment & Plan     Davina Jones is a 54 y.o. P3 with IC/BPS, ABDIAS, UUI/OAB, S2POP, FI. We discussed my recommendations for further diagnosis and treatment at length today.     Assessment & Plan  Cystocele, unspecified  - Resolved s/p SSLF.  - Intermittent pain may be related to healing/scarring. No pain today on exam. No systemic or exam findings concerning for infection. Continue to monitor. If persistent or worsens she will return to clinic  - Able to return to all activities without restrictions  - RFs for recurrence reviewed  - FU prn        Overactive bladder  UUI  Improved s/p SSLF. Opts for expectant management at this time.  - FU prn        ABDIAS (stress urinary incontinence, female)  - Not significantly bothered by this.   - FU prn          Tyra Bueno MD  Urogynecology and Reconstructive Pelvic Surgery  Department of Obstetrics and Gynecology  [facility]

## 2025-06-03 NOTE — PROGRESS NOTES
Patient here for Post Op Exam  Surgery Date: 04/21/2025  PT States some random sharp pains in her tailbone  PVR : 10mL  Good #: [phone]   Pharmacy Verified

## 2025-07-03 NOTE — PROGRESS NOTES
"Urogynecology & Reconstructive Pelvic Surgery - Preop Visit    Davina Jones MRN:[id number] :1970    Referred by: Dr. Garfield Giron ([facility], [place], operated by [facility])    Reason for Visit:   Chief Complaint   Patient presents with    Procedure     UDS + Pre-op         Subjective     History of Presenting Illness:  Davina Jones is a 54 y.o. P3 with HTN, LBP, tobacco use (1/2PPD), FBPS, ABDIAS, UUI/OAB, S2POP and FI here for preop and UDS for scheduled vaginal vault suspension, possible anterior repair, midurethral sling and all indicated procedures on 25.    FBPD: trying to adhere to IC diet but still drinking a lot of caffeine. Stop hydroxyzine because too  drowsy and spacey without improvement in symptoms.     POP, ABDIAS symptoms unchanged. ABDIAS does not occur often.     OAB/UUI: using vaginal estrogen intermittently, last 2-3d.     FI: with fiber no urgency or smearing. When she doesn't remember to take then symptoms return.     Cut down cigs to <1/2PPD.     Allergies:   Allergies   Allergen Reactions    Toradol      Pt states Toradol causes burning/bad taste/nausea    Fentanyl      Per GI \"paradoxical reaction\"       Preop: Denies MI, stroke, lung disease, VTE, ASA or anticoagulation.     Initial symptoms:   Interstitial cystitis - no current treatment. Did try IC diet that helped but difficult to maintain. BIs did no help and not feasible as she lives in [place]. Elmiron was too expensive. Felt like wygovy helped a lot, but the cost of this also increased and she has not taken it recently.   Typical IC sx: stabbing bladder pain, unsure if worse with full, no burning with urination, no hematuria, incomplete bladder emptying during flares, urgency, frequency. UTI tests neg. H/o kidney stones.   Triggers: stress (lives with ex), sodas     Leaks with urge trying to get to the bathroom. Also leaks cough, laugh and sneeze.  UUI bothers her more than ABDIAS.     Occasionally feels tissue at the " Care Management Discharge Note:      07/03/25 1457   Discharge Planning   Patient expects to be discharged to: House   Services At/After Discharge   Transition of Care Consult (CM Consult) N/A   Services At/After Discharge None   Mode of Transport at Discharge Other (see comment)  (family)   Confirm Follow Up Transport Family     Patient with dc orders. No identified CM needs. Patient will dc home with family to transport.   ______________________  Holly BRUSH, RN  Care Management  7/3/2025     entrance of her vagina with wiping. Feels bulge/pressure.     Fiber spoonful daily.     Has not taken her BP in a few days. Denies chest pain, SOB, HA or vision changes.     Prior Pelvic surgery:   1999 BTL  2010 TVH, BS - pelvic pain ([facility] surgeon, not [facility])  2018 Rectocele repair ([facility], surgeon moved to [place]) - prolapse, c/b left nerve damage still have weakness going up stairs, vagina goes numbs sometime on the left   Appy     Prior treatment:   PFPT  Kegels  Oxybutynin - Developed SE (dry eye, mouth and constipation, dizziness, N)  Trospium - Developed SE (dry eye, mouth and constipation, dizziness, N)  Tolterodine - Developed SE (dry eye, mouth and constipation, dizziness, N)  Solifenacin - Developed SE (dry eye, mouth and constipation, dizziness, N)  Mirabegon - developed SE  Elmiron - too expensive   BIs - no improvement   Zrytec - no improvement   Hydroxyzine - too drowsy, no improvement    Vaginal E      Fluid intake:   Water: 32oz   Coffee:     Pelvic floor symptom review:     Bladder:   Voids per day: 10 Voids per night: 2      Urinary incontinence episodes per day: 3-4    Urge leakage:  On Movement to Bathroom   Stress leakage: With Cough and With Laugh   Continuous / insensible urine loss: Yes   Nocturnal enuresis: Yes   Leakage volume: Drops   Number of pads/day: 0    Bladder emptying: Incomplete sometimes    Voiding symptoms: Post-Void Dribble leans forward to void    UTI in last 12 months: 0   Other urologic history: h/o stones       Prolapse:     Prolapse symptoms: Bulge, Exteriorized Tissue, and Pelvic Pressure   Degree of prolapse: To Introitus   Duration of prolapse symptoms: 2017      Bowel:    Constipation: Yes   Bowel movements per day: q2-3d , stool quality: mush-liquid joanie with Wygovy    Straining to empty bowels: Yes   Splinting to evacuate: No    Painful evacuation: No    Difficulty emptying rectum: Yes   Incontinence to stool: Yes 1x/week with urgency, stool mush-soft    Blood in stool:  "No    Hemorrhoids: Yes   Bowel conditions: none   Most recent colonoscopy:        Sexual function:    Sexually active: No lack of interest   Gender of partners: Male   Pain with intercourse: No   History of abuse: No        Pelvic Pain: None       Past medical and surgical history    Past obstetric history   Number of vaginal deliveries: 3   Number of  deliveries: 0   History of vacuum/forceps: Yes vacuum and forceps in same delivery -> right leg nerve damage    History of obstetric anal sphincter injury: No     Past gynecological history:    Last menstrual period/Menopause: Patient's last menstrual period was 2011.   History of abnormal uterine bleeding: No    History of fibroids: No    History of endometrial polyps:  No    History of endometriosis: No    History of cervical dysplasia: No    Last pap: s/p hyst   Current contraception: s/p hyst      Past medical history:  Past Medical History:   Diagnosis Date    Bowel habit changes 2018    constipation and diarrhea intervals    Dental disorder 2011    cavities and broken teeth    Pain 2011    pelvic area, 5/10    Hepatitis A 1997    Anesthesia     Patient states she \"freaks out\" after surgeries.    Arthritis     Bronchitis     Gynecological disorder     Hypertension     Shortness of breath     Snoring     Urinary bladder disorder     Urinary incontinence      Past surgical history:  Past Surgical History:   Procedure Laterality Date    RECTOCELE REPAIR  2018    Procedure: RECTOCELE REPAIR and perinoeplasty;  Surgeon: Jesus Santana M.D.;  Location: SURGERY [facility];  Service: Urology    VAGINAL HYSTERECTOMY TOTAL  2011    Performed by ANABELLE CORRALES at SURGERY SAME DAY [facility]    CYSTOSCOPY  2011    Performed by ANABELLE CORRALES at SURGERY SAME DAY [facility]    TUBAL LIGATION      TONSILLECTOMY  1998    MAMMOPLASTY REDUCTION      EYE SURGERY      lasik    HYSTERECTOMY, VAGINAL      still " "have both ovaries    CA BREAST REDUCTION       Medications:has a current medication list which includes the following prescription(s): amlodipine, multiple vitamins-minerals, cyanocobalamin, multiple vitamins-minerals, elderberry, estradiol, losartan, meloxicam, ventolin hfa, ibuprofen, and cyclobenzaprine.  Allergies:Toradol and Fentanyl  Family history:  Family History   Problem Relation Age of Onset    Heart Disease Mother     Hyperlipidemia Mother     Hypertension Mother     Dementia Mother     Cancer Father         lung cancer    Lung Disease Father         copd    Heart Disease Father     Cancer Sister         lung, smoker    Diabetes Maternal Grandmother     Cancer Paternal Grandfather         leukemia     Social history: reports that she has been smoking cigarettes. She started smoking about 39 years ago. She has a 13.5 pack-year smoking history. She has never used smokeless tobacco. She reports that she does not currently use alcohol. She reports that she does not use drugs.    Review of systems: A full review of systems was performed, and negative with the exception of want is noted above in the HPI.        Objective        /76 (BP Location: Right arm, Patient Position: Sitting, BP Cuff Size: Adult)   Ht 5' 4\"   Wt 209 lb 3.2 oz   LMP 01/01/2011   BMI 35.91 kg/m²     Physical Exam  Constitutional:       Appearance: Normal appearance. She is normal weight.   HENT:      Head: Normocephalic.      Nose: Nose normal.   Eyes:      Pupils: Pupils are equal, round, and reactive to light.   Cardiovascular:      Comments: Elevated BP   Pulmonary:      Effort: Pulmonary effort is normal.   Abdominal:      Palpations: Abdomen is soft.   Musculoskeletal:         General: Normal range of motion.      Cervical back: Normal range of motion.   Skin:     General: Skin is warm.   Neurological:      General: No focal deficit present.      Mental Status: She is alert.   Psychiatric:         Mood and Affect: Mood " normal.        Pelvic deferred, last 12/10/24:  Genitourinary:    Vulva: WNL   Bulbocavernosus reflex: Intact   Anal wink reflex: Intact   Perineal sensation: WNL   Urethra: WNL   Vagina: WNL   Atrophy: None   Cough stress test: Negative    Chaperone was present throughout the physical exam.     Pelvic floor:    POP-Q:   Aa 0 Ba 0 C -6   GH 4 PB 2 TVL 8.5   Ap -3 Bp -3 D x      Prolapse stage: 2   Cervical elongation: No    Urethral tenderness: No    Bladder/ suprapubic tenderness: No    Levator tenderness: Right   Levator muscle tone: WNL   Pelvic floor contraction strength (modified Oxford scale): 3=Moderate   Pelvic floor contraction duration: Normal   Bimanual exam: surgically absent uterus and cervix    Anal resting tone: WNL   Anal squeeze tone: Decreased   Palpable anal sphincter defect: No    Granulation tissue: No    Epithelial erosions: No    Epithelial ulcerations: No    Fistula: None   Vaginal band/stricture: No     Procedure Performed: No    Chaperone was present throughout the physical exam.     Diagnostic test and records review:    Urine dipstick: void prior to appt      Post-void residual: 30 mL, performed by Bladder Scanner    Labs: None current     Radiology: None    Procedural:   04/02/25 UDS:  Filling Phase: Normal sensation. Normal compliance. +DO without DOI. Absent UDSI. Normal capacity.  Voiding Phase: Voids via detrusor. PVR normal (72). Elevated voiding pressures but normal PVR, EMG and flow; suspect secondary to anatomic obstruction from prolapse.    Documentation reviewed: Home Medications    Outside records reviewed: 18 + 97 pages      Assessment & Plan     Davina Jones is a 54 y.o. P3 with IC/BPS, ABDIAS, UUI/OAB, S2POP, FI. We discussed my recommendations for further diagnosis and treatment at length today.     Assessment & Plan  Vaginal vault prolapse after hysterectomy  Cystocele  ABDIAS  At her prior visit as well as today we reviewed all treatment options for prolapse  including conservative/observation, pessary, and both vaginal and laparoscopic approaches as well as native tissue and mesh augmented approaches. After detailed counseling about all prolapse treatment options and shared decision-making, she opts for a native tissue vaginal reconstructive approach to prolapse repair via vaginal vault suspension, possible anterior/posterior repair, perineal repair cystourethroscopy and any indicated procedures.  She has reviewed all preoperative written materials and expressed understanding about the procedure, risks, benefits, and recovery.    Pre-operative urodynamics did not demonstrate stress urinary incontinence with reduction of prolapse and she was counseled against a concomitant midurethral sling and she had minimal ABDIAS at baseline and has increased risk of mesh complications in the setting of tobacco use.     Benefits of surgery were reviewed, including functional outcomes (bladder/bowel/sexual). Risks of surgery were  also discussed including anesthesia, bleeding, infection, damage to surrounding organs (bladder, ureter, urethra, bowel, blood vessel, nerves), possible blood transfusion, recurrent prolapse, transient buttock pain if sacrospinous suspension performed, transient voiding dysfunction requiring catheterization, new/worsening urinary incontinence, pain with sex.     Specifically she was counselled as to what to expect on the day of surgery in the holding area, counseled that medical students may be involved in her care. She will likely go home on the same day as the surgery. She was counseled on what to expect in the recovery room including voiding trial and possible vaginal packing removal, as well as what to expect if voiding trial is unsuccessful, which would be transient catheterization. Discussed trajectory of recovery, including maximizing NSAIDs and Tylenol, and that narcotics are not routinely given. Discussed restrictions including heavy lifting that  requires straining, driving while on narcotics, nothing in the vagina and no bathing/swimming for at least 6 weeks until evaluated in the office. Return to work discussed.  *Please see the corresponding after visit summary counseling packet for detailed counseling provided for the patient.   Labs:eGFR 106, Cr 0.63, H/H wnl   Pre-op meds: acetaminophen 1000mg PO, phenazopyridine 200mg PO, Cefazolin 2gm IV   Post-op medication plan: ibuprofen, tylenol, miralax   Home medication review: She should additionally hold all NSAIDs and supplements for 1 week prior to surgery.   Orders:    POCT Urinalysis    Bladder pain syndrome  Continue IC diet. DC hydroxyzine.   Orders:    POCT Urinalysis    Urgency incontinence  OAB  - Will focus on addressing POP and ABDIAS first. If symptoms persistent after surgery will consider other treatment options.   Orders:    POCT Urinalysis    Incontinence of feces with fecal urgency  Continue fiber  Orders:    POCT Urinalysis    Tobacco consumption  Discussed increased risk of poor wound healing and mesh exposure given nicotine use leading to vasoconstriction in the setting of a mesh augmented surgery (midurethral sling). She would like to defer MUS at this time. If ABDIAS worsen after prolapse surgery will consider treatment options.   Orders:    POCT Urinalysis      Tyra Bueno MD  Urogynecology and Reconstructive Pelvic Surgery  Department of Obstetrics and Gynecology  [facility]

## (undated) DEVICE — SUTURE 2-0 SILK SH 24 (36PK/BX)"

## (undated) DEVICE — DRAPE STRLE REG TOWEL 18X24 - (10/BX 4BX/CA)"

## (undated) DEVICE — SUCTION INSTRUMENT YANKAUER OPEN TIP W/O VENT (50EA/CA)

## (undated) DEVICE — SCRUB SOLUTION EXIDINE 4% 40Z - 48/CS CHLORAHEXADINE GLUCONATE

## (undated) DEVICE — STAPLER SKIN DISP - (6/BX 10BX/CA) VISISTAT

## (undated) DEVICE — GOWN WARMING STANDARD FLEX - (30/CA)

## (undated) DEVICE — LACTATED RINGERS INJ 1000 ML - (14EA/CA 60CA/PF)

## (undated) DEVICE — TUBING CLEARLINK DUO-VENT - C-FLO (48EA/CA)

## (undated) DEVICE — CLOSURE SKIN STRIP 1/2 X 4 IN - (STERI STRIP) (50/BX 4BX/CA)

## (undated) DEVICE — ELECTRODE DUAL RETURN W/ CORD - (50/PK)

## (undated) DEVICE — NEEDLE MAYO CATGUT TPR POINT - (2EA/PK20PK/BX)

## (undated) DEVICE — DEVICE SUTURE CAPTURING

## (undated) DEVICE — SET LEADWIRE 5 LEAD BEDSIDE DISPOSABLE ECG (1SET OF 5/EA)

## (undated) DEVICE — PAD SANITARY 11IN MAXI IND WRAPPED  (12EA/PK 24PK/CA)

## (undated) DEVICE — GOWN SURGEONS X-LARGE - DISP. (30/CA)

## (undated) DEVICE — KIT SURGIFLO W/OUT THROMBIN - (6EA/CA)

## (undated) DEVICE — Device

## (undated) DEVICE — WATER IRRIGATION STERILE 1000ML (12EA/CA)

## (undated) DEVICE — TRAY FOLEY CATHETER STATLOCK 16FR SURESTEP (10EA/CA)

## (undated) DEVICE — SLEEVE STERILE  A599T - 30/BX 2BX/CS

## (undated) DEVICE — SUCTION INSTRUMENT YANKAUER BULBOUS TIP W/O VENT (50EA/CA)

## (undated) DEVICE — MONODEK SUTURE - 12/BX

## (undated) DEVICE — KIT  I.V. START (100EA/CA)

## (undated) DEVICE — NEEDLE SAFETY 22 GA 1-1/2 IN (50/BX)

## (undated) DEVICE — GLOVE SZ 7.5 BIOGEL PI MICRO - PF LF (50PR/BX)

## (undated) DEVICE — SODIUM CHL IRRIGATION 0.9% 1000ML (12EA/CA)

## (undated) DEVICE — GLOVE BIOGEL INDICATOR SZ 7SURGICAL PF LTX - (50/BX 4BX/CA)

## (undated) DEVICE — DRAPE VAGINAL BIB W/ POUCH (10EA/CA)

## (undated) DEVICE — TRAY FOLEY CATHETER STATLOCK 16FR SURESTEP  (10EA/CA)

## (undated) DEVICE — GLOVE BIOGEL INDICATOR SZ 7.5 SURGICAL PF LTX - (50PR/BX 4BX/CA)

## (undated) DEVICE — SYRINGE 10 ML CONTROL LL (25EA/BX 4BX/CA)

## (undated) DEVICE — SLEEVE VASO DVT COMPRESSION CALF MED - (10PR/CA)

## (undated) DEVICE — CANNULA O2 COMFORT SOFT EAR ADULT 7 FT TUBING (50/CA)

## (undated) DEVICE — TUBE CONNECTING SUCTION - CLEAR PLASTIC STERILE 72 IN (50EA/CA)

## (undated) DEVICE — GLOVE BIOGEL SZ 7 SURGICAL PF LTX - (50PR/BX 4BX/CA)

## (undated) DEVICE — GLOVE BIOGEL SZ 7.5 SURGICAL PF LTX - (50PR/BX 4BX/CA)

## (undated) DEVICE — SLEEVE, VASO, THIGH, MED

## (undated) DEVICE — SUTURE GENERAL

## (undated) DEVICE — SUTURE 4-0 CHROMIC RB-1 27 (36PK/BX)"

## (undated) DEVICE — SUTURE 3-0 VICRYL PLUS SH - 27 INCH (36/BX)

## (undated) DEVICE — GLOVE BIOGEL INDICATOR SZ 6.5 SURGICAL PF LTX - (50PR/BX 4BX/CA)

## (undated) DEVICE — PROTECTOR ULNA NERVE - (36PR/CA)

## (undated) DEVICE — CLEANER ELECTRO-SURGICAL TIP - (25/BX 4BX/CA)

## (undated) DEVICE — TRAY CATHETER FOLEY URINE METER W/STATLOCK 350ML (10EA/CA)

## (undated) DEVICE — SUTURE 4-0 MONOCRYL PLUS PS-2 - 27 INCH (36/BX)

## (undated) DEVICE — BLADE SURGICAL #15 - (50/BX 3BX/CA)

## (undated) DEVICE — GLOVE BIOGEL SZ 6 PF LATEX - (50EA/BX 4BX/CA)

## (undated) DEVICE — TOWEL STOP TIMEOUT SAFETY FLAG (40EA/CA)

## (undated) DEVICE — CANISTER SUCTION 3000ML MECHANICAL FILTER AUTO SHUTOFF MEDI-VAC NONSTERILE LF DISP (40EA/CA)

## (undated) DEVICE — BANDAGE ROLL STERILE BULKEE 4.5 IN X 4 YD (100EA/CA)

## (undated) DEVICE — MASK, LARYNGEAL AIRWAY #4

## (undated) DEVICE — SET IRRIGATION CYSTOSCOPY TUBE L80 IN (20EA/CA)

## (undated) DEVICE — SUTURE 2-0 VICRYL PLUS SH - 27 INCH (36/BX)

## (undated) DEVICE — CANISTER SUCTION RIGID RED 1500CC (40EA/CA)

## (undated) DEVICE — MASK ANESTHESIA ADULT  - (100/CA)

## (undated) DEVICE — SENSOR SPO2 NEO LNCS ADHESIVE (20/BX) SEE USER NOTES

## (undated) DEVICE — CORDS BIPOLAR COAGULATION - 12FT STERILE DISP. (10EA/BX)

## (undated) DEVICE — CANISTER SUCTION 3000ML MECHANICAL FILTER AUTO SHUTOFF MEDI-VAC NONSTERILE LF DISP  (40EA/CA)

## (undated) DEVICE — SENSOR OXIMETER ADULT SPO2 RD SET (20EA/BX)

## (undated) DEVICE — GLOVE BIOGEL SZ 8 SURGICAL PF LTX - (50PR/BX 4BX/CA)

## (undated) DEVICE — MASK OXYGEN VNYL ADLT MED CONC WITH 7 FOOT TUBING - (50EA/CA)

## (undated) DEVICE — JELLY, KY 2 0Z STERILE

## (undated) DEVICE — SET EXTENSION WITH 2 PORTS (48EA/CA) ***PART #2C8610 IS A SUBSTITUTE*****

## (undated) DEVICE — DETERGENT RENUZYME PLUS 10 OZ PACKET (50/BX)

## (undated) DEVICE — KIT ROOM DECONTAMINATION

## (undated) DEVICE — SYRINGE ASEPTO - (50EA/CA

## (undated) DEVICE — KIT ANESTHESIA W/CIRCUIT & 3/LT BAG W/FILTER (20EA/CA)

## (undated) DEVICE — SUTURE 0 VICRYL PLUS CT-2 - 27 INCH (36/BX)

## (undated) DEVICE — NEEDLE NON SAFETY 27GA X 1-1/4 IN HYPO (100EA/BX)

## (undated) DEVICE — RINGDISP RETRACTOR LONESTAR

## (undated) DEVICE — ELECTRODE 850 FOAM ADHESIVE - HYDROGEL RADIOTRNSPRNT (50/PK)

## (undated) DEVICE — TRAY SRGPRP PVP IOD WT PRP - (20/CA)

## (undated) DEVICE — PAD SANITARY 11IN MAXI IND WRAPPED (12EA/PK 24PK/CA)

## (undated) DEVICE — COVER LIGHT HANDLE FLEXIBLE - SOFT (2EA/PK 80PK/CA)

## (undated) DEVICE — BLADE SURGICAL #11 - (50/BX)

## (undated) DEVICE — NEPTUNE 4 PORT MANIFOLD - (20/PK)

## (undated) DEVICE — PACKING VAG 2 X-RAY (24EA/CS)

## (undated) DEVICE — HEAD HOLDER JUNIOR/ADULT